# Patient Record
Sex: FEMALE | Race: WHITE | NOT HISPANIC OR LATINO | Employment: OTHER | URBAN - METROPOLITAN AREA
[De-identification: names, ages, dates, MRNs, and addresses within clinical notes are randomized per-mention and may not be internally consistent; named-entity substitution may affect disease eponyms.]

---

## 2017-01-09 ENCOUNTER — AMBULATORY - HEALTHEAST (OUTPATIENT)
Dept: PULMONOLOGY | Facility: OTHER | Age: 52
End: 2017-01-09

## 2017-01-09 DIAGNOSIS — J45.909 ASTHMA: ICD-10-CM

## 2017-01-12 ENCOUNTER — AMBULATORY - HEALTHEAST (OUTPATIENT)
Dept: PULMONOLOGY | Facility: OTHER | Age: 52
End: 2017-01-12

## 2017-01-12 DIAGNOSIS — J45.909 ASTHMA: ICD-10-CM

## 2017-01-13 ENCOUNTER — AMBULATORY - HEALTHEAST (OUTPATIENT)
Dept: PULMONOLOGY | Facility: OTHER | Age: 52
End: 2017-01-13

## 2017-04-24 ENCOUNTER — COMMUNICATION - HEALTHEAST (OUTPATIENT)
Dept: PULMONOLOGY | Facility: OTHER | Age: 52
End: 2017-04-24

## 2017-04-24 DIAGNOSIS — J45.909 ASTHMA: ICD-10-CM

## 2017-05-26 ENCOUNTER — HEALTH MAINTENANCE LETTER (OUTPATIENT)
Age: 52
End: 2017-05-26

## 2017-06-05 ENCOUNTER — AMBULATORY - HEALTHEAST (OUTPATIENT)
Dept: PULMONOLOGY | Facility: OTHER | Age: 52
End: 2017-06-05

## 2017-06-05 DIAGNOSIS — J45.909 ASTHMA: ICD-10-CM

## 2021-06-08 NOTE — PROGRESS NOTES
Pt returned call and LM that she got a call from [employer] saying that they were going to cover the Advair.  Haritha is going to call the pharmacy and see if it went through.  She said that she will call me back if it does not go through and she needs further help.

## 2021-06-08 NOTE — PROGRESS NOTES
Received a denial back from [facility].  They denied it because the formulary alternatives have not been tried.  The preferred formulary alternatives are: Serevent Diskus, Aerospan and Asmanex HFA.  I sent a staff message to Dr. Humphries asking if she would like to switch to one of these alternatives? Or would like to do an appeal?  Will wait for response from Dr. Humphries.

## 2021-06-08 NOTE — PROGRESS NOTES
Dr. Humphries responded saying let's try Asmanex first.  I LMTCB for pt to make sure that she would be ok with switching to this before we send rx.  Will wait for return call.

## 2021-06-08 NOTE — PROGRESS NOTES
Received a PA request from [employer] for pts Advair Diskus 100/50mcg.  I called Tai and started a PA over the phone as pt has tried the alternatives.  Will wait to hear a response.

## 2023-10-24 DIAGNOSIS — R30.0 DYSURIA: ICD-10-CM

## 2023-10-24 DIAGNOSIS — H66.90 ACUTE OTITIS MEDIA, UNSPECIFIED OTITIS MEDIA TYPE: Primary | ICD-10-CM

## 2023-10-25 RX ORDER — NEOMYCIN SULFATE, POLYMYXIN B SULFATE AND HYDROCORTISONE 10; 3.5; 1 MG/ML; MG/ML; [USP'U]/ML
SUSPENSION/ DROPS AURICULAR (OTIC)
Qty: 10 ML | Refills: 0 | Status: SHIPPED | OUTPATIENT
Start: 2023-10-25

## 2023-10-25 RX ORDER — CIPROFLOXACIN 500 MG/1
500 TABLET, FILM COATED ORAL 2 TIMES DAILY
Qty: 6 TABLET | Refills: 0 | Status: SHIPPED | OUTPATIENT
Start: 2023-10-25 | End: 2023-10-28

## 2024-03-28 ENCOUNTER — TRANSFERRED RECORDS (OUTPATIENT)
Dept: HEALTH INFORMATION MANAGEMENT | Facility: CLINIC | Age: 59
End: 2024-03-28

## 2024-08-22 ENCOUNTER — PATIENT OUTREACH (OUTPATIENT)
Dept: ONCOLOGY | Facility: CLINIC | Age: 59
End: 2024-08-22
Payer: COMMERCIAL

## 2024-08-22 ENCOUNTER — TRANSCRIBE ORDERS (OUTPATIENT)
Dept: OTHER | Age: 59
End: 2024-08-22

## 2024-08-22 DIAGNOSIS — C50.919 BREAST CANCER (H): Primary | ICD-10-CM

## 2024-08-22 NOTE — PROGRESS NOTES
New Patient Oncology Nurse Navigator Note     Referring provider: Self      Referred to (specialty:) Medical Oncology     Requested provider (if applicable): Dr. Rakel Alas     Date Referral Received: August 22, 2024     Evaluation for:  C50.919 (ICD-10-CM) - Breast cancer (H)     Clinical History (per Nurse review of records provided):      Haritha Benavides is a 59 year old female who had bilateral screening mammograms on 2/12/24 finding an asymmetry in the right breast at the 11 o'clock position at posterior depth and  indeterminate calcifications in the left breast at the 2 o'clock position at posterior depth. Bilateral diagnostic mammograms and right breast ultrasound followed on 2/16. FINDINGS: Right CC and MLO spot C-Views with tomosynthesis. Left CC and ML magnification views. There are scattered areas of fibroglandular density. 0.5 x 0.5 cm group of coarse heterogeneous calcifications at the 2 o'clock position of the left breast, posterior depth, these are suspicious. Persistence of the focal asymmetry at the 10 o'clock position of the right breast, posterior depth.    Right breast ultrasound from the 8-12 o'clock position was performed. No suspicious mass, cyst or architectural distortion. Findings were confirmed with real-time imaging.   RECOMMENDATIONS:  Left breast stereotactic biopsy of the suspicious calcifications at the 2 o'clock position. Depending on the biopsy results, six-month right breast diagnostic mammogram follow-up.     2/20/24 - Case: [id number]   A.  Breast, left, 2 O'clock, Stereotactic, needle core biopsy:  Ductal carcinoma in-situ (DCIS), grade 2, without necrosis  Calcifications associated with DCIS     Estrogen Receptor:         Positive           %   Strong      2/27/24 - Case: [id number]   A.  Breast, right, 11 o'clock Stereotactic, needle core biopsy:  Benign breast tissue with apocrine cysts    She met with Dr. Tamela Alfaro on 3/5/24 and proceeded with  "surgery.    3/28/24 - Case: [id number]   A.  Breast, left, excision:  Ductal carcinoma in-situ (DCIS), see following synoptic report for additional details     DCIS OF THE BREAST: Resection   DCIS OF THE BREAST: COMPLETE EXCISION - All Specimens     SPECIMEN     Procedure:    Excision (less than total mastectomy)      Specimen Laterality:    Left     TUMOR     Tumor Site:    Clock position  :    2 o'clock    Histologic Type:    Ductal carcinoma in situ    Size (Extent) of DCIS:         Number of Blocks with DCIS:    2      Number of Blocks Examined:    14    Architectural Patterns:    Cribriform    Nuclear Grade:    Grade II (intermediate)    Necrosis:    Present, central (expansive \"comedo\" necrosis)    Microcalcifications:    Present in DCIS     MARGINS    Margin Status:    All margins negative for DCIS      Distance from DCIS to Closest Margin:    3 mm      Closest Margin(s) to DCIS:    Superior     REGIONAL LYMPH NODES    Regional Lymph Node Status:    Not applicable (no regional lymph nodes submitted or found)     PATHOLOGIC STAGE CLASSIFICATION (pTNM, AJCC 8th Edition)      Reporting of pT, pN, and (when applicable) pM categories is based on information available to the pathologist at the time the report is issued. As per the AJCC (Chapter 1, 8th Ed.) it is the managing physician s responsibility to establish the final pathologic stage based upon all pertinent information, including but potentially not limited to this pathology report.    pT Category:    pTis (DCIS)    pN Category:    pN not assigned (no nodes submitted or found)      Breast Biomarker Testing Performed on Previous Biopsy:         Estrogen Receptor (ER) Status:    Positive        Percentage of Cells with Nuclear Positivity:    %      Testing Performed on Case Number:    [id number]     Consult with Dimitry Rojas MD on 4/15/24   Has been on hormone replacement therapy for almost 8 years.  Stopped after diagnosis of DCIS. Using   " estradiol vaginal tablet Twice weekly. Tells that she feels better with vaginal estradiol tablets than other  non estrogen once that she had tried  not only in regards to dryness and  pain,  also with decreased urination, tells that she finds other benefits     Consult with radiation oncologist Dr. Scottie Chandler of [facility] on 4/19    DEXA scan 4/29/24 with osteopenia T-score -1.6.     Most recent visit to Dr. Rojas on 5/9/24 with the following topics addressed:  - saw Radiation Oncology, had DCISion RT testing, which showed low predicted risk, no significant benefit from radiation. Radiotherapy omitted  - Discussed role adjuvant endocrine therapy, to prevent ipsilateral recurrences and new events both in same and contralateral breast, by around 4%.  -reviewed protective effects of estrogen and potential side effects with adjuvant endocrine therapy, pulled up-to-date and reviewed. Patient concerned about the side effects, patient would like to think more before making decision.  -discussed that monitoring hormone levels is not a standard and do not correlate with the benefit  -discussed surveillance mammogram annually. Patient concerned about waiting that long, prefers getting mammograms every 6 months. Discussed that it is not a standard, ordered per patient request, discussed to check with insurance to avoid out-of-pocket cost.     Records Location: Care Everywhere, Media, and See Bookmarked material     Records Needed:  Path reports-biopsy and surgery (per protocol)  Pathology reviews (per protocol)  Breast imaging for past 5 years  Systemic imaging (per protocol)  Med onc notes, rad consult note, OP note, surgeons note (per protocol)    8/22 8726 - Telephoned and left voice message on [employer] number for Haritha requesting call back. Explained my role and purpose for the call stating Dr. Alas' next available appointment is in October. She has my contact information or I will call her back on  Monday.     8/26 1216 - Haritha lvm on 8/23 at 0807 indicating she can wait and see Dr. Alas next available and to please add to cancellation list if something were to become available sooner. Telephoned and spoke with Haritha. Writer received referral, reviewed for appropriate plan, and call warm transferred to New Patient Scheduling for completion.

## 2024-10-09 NOTE — TELEPHONE ENCOUNTER
RECORDS STATUS - BREAST    [facility]   DATE REQUESTED:    NOTES DETAILS STATUS   OFFICE NOTE from medical oncologist  -  Dr. Dmiitry Rojas: 8/15/24   OFFICE NOTE from surgeon  -  Dr. Tamela Alfaro: 4/16/24   OFFICE NOTE from radiation oncologist  - /[facility] Dr. Colton Gits: 4/19/24   OPERATIVE REPORT  -  3/28/24: Lumpectomy   MEDICATION LIST [facility]   LABS     PATHOLOGY REPORTS  (Tissue diagnosis, Stage, ER/IN percentage positive and intensity of staining, HER2 IHC, FISH, and all biopsies from breast and any distant metastasis)                 HP/Yarsani, Reports in CE, slides requested 10/9 Requested  3/28/24: [id number]    Not requested/benign per report:  2/27/24: [id number]  2/20/24: [id number]   PATHOLOGY [employer] TRACKING:   TRACKING #: [id number]   GENONOMIC TESTING     TYPE:   (Next Generation Sequencing, including Foundation One testing, and Oncotype score) [facility] - Requested 10/9 3/28/24   IMAGING (NEED IMAGES & REPORT)     MAMMO PACS 3/28/24, 3/27/24, 2/27/24, 2/20/24, 2/16/24, 2/12/24, 8/15/22, 7/30/21, 7/1/21, 4/30/19, 2/2/18, 8/31/16, 8/18/16:    ULTRASOUND PACS 3/27/24, 2/27/24, 2/16/24, 8/31/16:    BONE SCAN PACS 4/29/24:

## 2024-10-10 NOTE — TELEPHONE ENCOUNTER
Action October 10, 2024 12:33 PM    Action Taken Slides from [facility] received and taken to 5th floor path lab for review.

## 2024-10-11 ENCOUNTER — LAB REQUISITION (OUTPATIENT)
Dept: LAB | Facility: CLINIC | Age: 59
End: 2024-10-11
Payer: COMMERCIAL

## 2024-10-11 PROCEDURE — 88321 CONSLTJ&REPRT SLD PREP ELSWR: CPT | Mod: GC | Performed by: STUDENT IN AN ORGANIZED HEALTH CARE EDUCATION/TRAINING PROGRAM

## 2024-10-12 ENCOUNTER — HEALTH MAINTENANCE LETTER (OUTPATIENT)
Age: 59
End: 2024-10-12

## 2024-10-14 ENCOUNTER — PRE VISIT (OUTPATIENT)
Dept: ONCOLOGY | Facility: CLINIC | Age: 59
End: 2024-10-14
Payer: COMMERCIAL

## 2024-10-14 ENCOUNTER — ONCOLOGY VISIT (OUTPATIENT)
Dept: ONCOLOGY | Facility: CLINIC | Age: 59
End: 2024-10-14
Attending: INTERNAL MEDICINE
Payer: COMMERCIAL

## 2024-10-14 VITALS
HEIGHT: 61 IN | RESPIRATION RATE: 18 BRPM | HEART RATE: 107 BPM | OXYGEN SATURATION: 97 % | WEIGHT: 158 LBS | BODY MASS INDEX: 29.83 KG/M2 | TEMPERATURE: 98.8 F | SYSTOLIC BLOOD PRESSURE: 128 MMHG | DIASTOLIC BLOOD PRESSURE: 82 MMHG

## 2024-10-14 DIAGNOSIS — D05.10 DUCTAL CARCINOMA IN SITU (DCIS) OF BREAST, UNSPECIFIED LATERALITY: Primary | ICD-10-CM

## 2024-10-14 DIAGNOSIS — M25.559 ARTHRALGIA OF HIP, UNSPECIFIED LATERALITY: ICD-10-CM

## 2024-10-14 LAB
PATH REPORT.COMMENTS IMP SPEC: NORMAL
PATH REPORT.FINAL DX SPEC: NORMAL
PATH REPORT.GROSS SPEC: NORMAL
PATH REPORT.MICROSCOPIC SPEC OTHER STN: NORMAL
PATH REPORT.RELEVANT HX SPEC: NORMAL
PATH REPORT.RELEVANT HX SPEC: NORMAL
PATH REPORT.SITE OF ORIGIN SPEC: NORMAL

## 2024-10-14 PROCEDURE — 99205 OFFICE O/P NEW HI 60 MIN: CPT | Performed by: INTERNAL MEDICINE

## 2024-10-14 PROCEDURE — G0463 HOSPITAL OUTPT CLINIC VISIT: HCPCS | Performed by: INTERNAL MEDICINE

## 2024-10-14 RX ORDER — DICLOFENAC SODIUM 25 MG/1
TABLET, DELAYED RELEASE ORAL
COMMUNITY
Start: 2024-03-29

## 2024-10-14 RX ORDER — ESTRADIOL 10 UG/1
10 INSERT VAGINAL
COMMUNITY
Start: 2024-09-19

## 2024-10-14 RX ORDER — KETOCONAZOLE 20 MG/ML
SHAMPOO, SUSPENSION TOPICAL
COMMUNITY
Start: 2024-01-18

## 2024-10-14 RX ORDER — LETROZOLE 2.5 MG/1
2.5 TABLET, FILM COATED ORAL DAILY
COMMUNITY
Start: 2024-07-16

## 2024-10-14 RX ORDER — DEXTROAMPHETAMINE SACCHARATE, AMPHETAMINE ASPARTATE, DEXTROAMPHETAMINE SULFATE AND AMPHETAMINE SULFATE 5; 5; 5; 5 MG/1; MG/1; MG/1; MG/1
TABLET ORAL
COMMUNITY
Start: 2024-08-05

## 2024-10-14 RX ORDER — GABAPENTIN 100 MG/1
CAPSULE ORAL
COMMUNITY

## 2024-10-14 RX ORDER — FLUTICASONE PROPIONATE 110 UG/1
1 AEROSOL, METERED RESPIRATORY (INHALATION)
COMMUNITY
Start: 2024-09-09 | End: 2025-09-09

## 2024-10-14 ASSESSMENT — PAIN SCALES - GENERAL: PAINLEVEL: NO PAIN (0)

## 2024-10-14 NOTE — LETTER
10/14/2024      Haritha Benavides  [address]  [place] [address]      Dear Colleague,    Thank you for referring your patient, Haritha Benavides, to the [facility]. Please see a copy of my visit note below.    October 14, 2024    Consultation and Second Opinion:      Left breast DCIS status post lumpectomy 03/28/2024, pTisNx, grade 2 ER % s/p resection, declined radiation, did not tolerate anastrazole    HPI: Mr/Ms Haritha Beanvides is a 59 y.o. yr old female patient with history of asthma, anxiety / depression, GERD seen for management of left breast DCIS  02/12/2024 bilateral screening mammogram with indeterminate calcifications in left breast at 2 o'clock position. Asymmetry in right breast at the 11 o'clock position  02/16/2024 diagnostic bilateral mammogram with scattered areas of fibroglandular density, 0.5 x 0.5 cm heterogeneous calcifications at 2 o'clock left breast. Persistent focal asymmetry at 10 o'clock. right breast. Right breast ultrasound with no suspicious mass, sister architectural distortion.  02/20/2024 left breast 2 o'clock. Biopsy with a DCIS, grade 2 without necrosis. Calcifications associated with DCIS. ER %  02/27/2024 right breast 11 o'clock biopsy with benign breast tissue with a apocrine cysts  03/28/2024 underwent lumpectomy by Dr. Alfaro. Pathology with DCIS, grade 2, necrosis present all margins negative for DCIS ( 3 mm) final pathologic stage P TisNx  Referred to radiation oncology.  DCIS recurrence score risk 3%.  No radiation.  She did a trial of anastrazole x 1 month.       In my discussion, she has had significant menopausal symptoms including the following:  - jaw clenching; now being treated with TMJ specialist  - no libido, vaginal dryness --> mildly improved with vaginal estrogen  - crying  -arthralgias and myalgias, difficulty concentrating,     During this time period, she has also gone through a lot of stressors - both parents  , she lives by [facility].    Has been on hormone replacement therapy for almost 8 years prior to her DCIS diagnosis. Stopped after diagnosis of DCIS. Using estradiol vaginal tablet Twice weekly. Tells that she feels better with vaginal estradiol tablets than other non estrogen once that she had tried not only in regards to dryness and pain, also with decreased urination, tells that she finds other benefits    She has been active.  She is gaining weight.  She is worried about weight loss.       ROS: 10 point ROS neg other than the symptoms noted above in the HPI.    PAST MEDICAL HISTORY  Past Medical History:   Diagnosis Date   Anxiety (HRC)   Asthma (HRC)   Breast cancer (HRC) 22   Depression (HRC)   Gastroesophageal reflux disease 18   Having tests run   Heartburn   Pap smear abnormality of cervix    no treatment   STD (sexually transmitted disease) (HRC)   chlamydia in her 's   Varicella     Patient Active Problem List   Diagnosis   Eczema   Depression with anxiety (HRC)   Migraine with aura and without status migrainosus, not intractable   Moderate persistent asthma with acute exacerbation (HRC)   Seborrheic dermatitis   Bursitis of hip   Seasonal allergic rhinitis   TMJ (temporomandibular joint disorder)   Serrated adenoma of colon   Gonzalez's esophagus without dysplasia   Malignant neoplasm of left breast in female, estrogen receptor positive (HRC)   Major depressive disorder, recurrent severe without psychotic features (HRC)   Attention-deficit hyperactivity disorder, combined type (HRC)   Attention-deficit hyperactivity disorder, predominantly inattentive type (HRC)       PAST SURGICAL HISTORY  Past Surgical History:   Procedure Laterality Date   LIPOSUCTION 's   TONSILLECTOMY   WISDOM TEETH EXTRACTION     SOCIAL HISTORY  Social History - 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, astrologer, air[employer]  No tobacco use  Partner is a family practitioner  Very good  "friends         FAMILY HISTORY : no cancers    PE:  /82 (BP Location: Right arm, Patient Position: Sitting, Cuff Size: Adult Regular)   Pulse 107   Temp 98.8  F (37.1  C) (Oral)   Resp 18   Ht 1.549 m (5' 1\")   Wt 71.7 kg (158 lb)   SpO2 97%   BMI 29.85 kg/m    Gen: well appearing NAD  HEENT: no icterus  No lad  Cv: rrr  Lungs: clear  Abd: soft, nt nd + bs  Breasts:  scar in upper outer quadrant of the Left breast, no nodules or masses, no axillary adenopathy    Reviewed labs and imaging    A/P:  58 y/o postmenopausal female with Tis L breast cancer, stage 0 s/p resection and no further therapy.    I had a long conversation today with Haritha about the treatment of DCIS.  We discussed that this is a precancer or noninvasive left breast cancer otherwise referred to as stage 0.  The treatment is surgical resection with consideration of radiation treatment.  She underwent surgical resection with clean margins.  Given her low DCIS recurrence score adjuvant radiation was declined.    I discussed with Haritha there are several studies ongoing looking at observation for patients with grade 1 and grade 2 DCIS such as hers.  1 of these trials is the Comet study.  This will be reported out at the [place] breast cancer symposium in .  I reviewed with her that in this clinical trial patients are randomized to standard of care which at that time was consisting of surgical resection and radiation with consideration of endocrine therapy or observation with mammograms every 6 months.  In the observation arm patients were eligible for endocrine therapy but this was not required.    I reviewed with Haritha we do not have the results of this study.  This does provide some guidance however that the current management of DCIS is controversial.  And that the risk of recurrence with someone with a low-grade DCIS of grade 1 and 2 is extremely small.  I agree with her treatment plan of resection.  It is also reasonable that " radiation was not recommended.    We discussed the use of antiestrogen therapy in patients with DCIS.  We discussed the use of tamoxifen at 5 mg or 20 mg or the use of endocrine therapy with an aromatase inhibitor such as exemestane have been studied in patients with DCIS.  These clinical trials showing improvement in reducing the risk of local recurrence or contralateral breast cancer recurrence.  They do not however improve overall survival in these patients.  Given the terrible symptoms that Haritha experienced with difficulty concentrating arthralgias myalgias anxiety, I did not recommend that she resume anastrozole.  She is in agreement with this plan.    The remainder of our time we discussed symptom management as well as ongoing surveillance.  I reviewed with her the ASCO guidelines for ongoing surveillance as well as the choosing wisely campaign which specifically recommends against CT scans, PET scans, and bone scans.  This is not recommended in those with early stage breast cancer.  For patients with DCIS this is not recommended given the concerns for causing harm with radiation exposure additional testing and inappropriate procedures.    We discussed ongoing breast surveillance using mammogram.  Given her dense breast tissue and recent recovery I recommended a diagnostic mammogram since it has been 6 months.  Based on the comet trial these were done every 6 months although the typical ASCO and NCCN guidelines recommend mammograms annually.    For other symptom management we discussed the importance of regular exercise, nutrition, and holistic health with meditation.  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  I recommended the book holistic menopause by Dr. Viki Meade a retired family practice physician here at the [facility].    She was given additional supportive resources from our survivorship diet [facility] webpage.    Given Haritha's anxiety, I recommended she remain On her Adderall as well as  Wellbutrin.    We did discuss given her current symptoms of pain and anxiety I recommended a one-time CT scan of the chest abdomen and pelvis to ensure there is no concerning findings.  I also recommended a new mammogram.    The patient will return to see Dina Jorge a symptom  in our survivorship clinic.  I will plan to see her back in 6 months.  I discussed that most patients with DCIS are followed annually for the first couple of years.  We will continue to see how Haritha is progressing with her symptoms to come up with a long-term plan.    > 75 min were spent in counseling and coordinating care, with an additional 20 min reviewing records.        Again, thank you for allowing me to participate in the care of your patient.      Sincerely,    Rakel Alas MD

## 2024-10-14 NOTE — NURSING NOTE
"Oncology Rooming Note    October 14, 2024 12:59 PM   Haritha Benavides is a 59 year old female who presents for:    Chief Complaint   Patient presents with    Oncology Clinic Visit     New Oncology Visit Breast Cancer     Initial Vitals: /82 (BP Location: Right arm, Patient Position: Sitting, Cuff Size: Adult Regular)   Pulse 107   Temp 98.8  F (37.1  C) (Oral)   Resp 18   Ht 1.549 m (5' 1\")   Wt 71.7 kg (158 lb)   SpO2 97%   BMI 29.85 kg/m   Estimated body mass index is 29.85 kg/m  as calculated from the following:    Height as of this encounter: 1.549 m (5' 1\").    Weight as of this encounter: 71.7 kg (158 lb). Body surface area is 1.76 meters squared.  No Pain (0) Comment: Data Unavailable   No LMP recorded.  Allergies reviewed: No  Medications reviewed: No    Medications: Medication refills not needed today.  Pharmacy name entered into EPIC:    [place], [place]  [id number] [place], [place] - [address] [place] [address] & [facility] [place], [place] - [address] [place], [place] [id number] [place], [place] - [address]    Frailty Screening:   Is the patient here for a new oncology consult visit in cancer care? 1. Yes. Over the past month, have you experienced difficulty or required a caregiver to assist with:   1. Balance, walking or general mobility (including any falls)? NO  2. Completion of self-care tasks such as bathing, dressing, toileting, grooming/hygiene?  NO  3. Concentration or memory that affects your daily life?  YES       Clinical concerns:  none      Joo Dudley LPN            "

## 2024-10-14 NOTE — PROGRESS NOTES
2024    Consultation and Second Opinion:      Left breast DCIS status post lumpectomy 2024, pTisNx, grade 2 ER % s/p resection, declined radiation, did not tolerate anastrazole    HPI: Mr/Ms Haritha Benavides is a 59 y.o. yr old female patient with history of asthma, anxiety / depression, GERD seen for management of left breast DCIS  2024 bilateral screening mammogram with indeterminate calcifications in left breast at 2 o'clock position. Asymmetry in right breast at the 11 o'clock position  2024 diagnostic bilateral mammogram with scattered areas of fibroglandular density, 0.5 x 0.5 cm heterogeneous calcifications at 2 o'clock left breast. Persistent focal asymmetry at 10 o'clock. right breast. Right breast ultrasound with no suspicious mass, sister architectural distortion.  2024 left breast 2 o'clock. Biopsy with a DCIS, grade 2 without necrosis. Calcifications associated with DCIS. ER %  2024 right breast 11 o'clock biopsy with benign breast tissue with a apocrine cysts  2024 underwent lumpectomy by Dr. Alfaro. Pathology with DCIS, grade 2, necrosis present all margins negative for DCIS ( 3 mm) final pathologic stage P TisNx  Referred to radiation oncology.  DCIS recurrence score risk 3%.  No radiation.  She did a trial of anastrazole x 1 month.       In my discussion, she has had significant menopausal symptoms including the following:  - jaw clenching; now being treated with TMJ specialist  - no libido, vaginal dryness --> mildly improved with vaginal estrogen  - crying  -arthralgias and myalgias, difficulty concentrating,     During this time period, she has also gone through a lot of stressors - both parents , she lives by [facility].    Has been on hormone replacement therapy for almost 8 years prior to her DCIS diagnosis. Stopped after diagnosis of DCIS. Using estradiol vaginal tablet Twice weekly. Tells that she feels better with vaginal  "estradiol tablets than other non estrogen once that she had tried not only in regards to dryness and pain, also with decreased urination, tells that she finds other benefits    She has been active.  She is gaining weight.  She is worried about weight loss.       ROS: 10 point ROS neg other than the symptoms noted above in the HPI.    PAST MEDICAL HISTORY  Past Medical History:   Diagnosis Date   Anxiety (HRC)   Asthma (HRC)   Breast cancer (HRC) 24   Depression (HRC)   Gastroesophageal reflux disease 20   Having tests run   Heartburn   Pap smear abnormality of cervix   's no treatment   STD (sexually transmitted disease) (HRC)   chlamydia in her 's   Varicella     Patient Active Problem List   Diagnosis   Eczema   Depression with anxiety (HRC)   Migraine with aura and without status migrainosus, not intractable   Moderate persistent asthma with acute exacerbation (HRC)   Seborrheic dermatitis   Bursitis of hip   Seasonal allergic rhinitis   TMJ (temporomandibular joint disorder)   Serrated adenoma of colon   Gonzalez's esophagus without dysplasia   Malignant neoplasm of left breast in female, estrogen receptor positive (HRC)   Major depressive disorder, recurrent severe without psychotic features (HRC)   Attention-deficit hyperactivity disorder, combined type (HRC)   Attention-deficit hyperactivity disorder, predominantly inattentive type (HRC)       PAST SURGICAL HISTORY  Past Surgical History:   Procedure Laterality Date   LIPOSUCTION 's   TONSILLECTOMY   WISDOM TEETH EXTRACTION     SOCIAL HISTORY  Social History - 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, [employer], [employer]  No tobacco use  Partner is a family practitioner  Very good friends         FAMILY HISTORY : no cancers    PE:  /82 (BP Location: Right arm, Patient Position: Sitting, Cuff Size: Adult Regular)   Pulse 107   Temp 98.8  F (37.1  C) (Oral)   Resp 18   Ht 1.549 m (5' 1\")   Wt 71.7 kg (158 lb)   SpO2 " 97%   BMI 29.85 kg/m    Gen: well appearing NAD  HEENT: no icterus  No lad  Cv: rrr  Lungs: clear  Abd: soft, nt nd + bs  Breasts:  scar in upper outer quadrant of the Left breast, no nodules or masses, no axillary adenopathy    Reviewed labs and imaging    A/P:  60 y/o postmenopausal female with Tis L breast cancer, stage 0 s/p resection and no further therapy.    I had a long conversation today with Haritha about the treatment of DCIS.  We discussed that this is a precancer or noninvasive left breast cancer otherwise referred to as stage 0.  The treatment is surgical resection with consideration of radiation treatment.  She underwent surgical resection with clean margins.  Given her low DCIS recurrence score adjuvant radiation was declined.    I discussed with Haritha there are several studies ongoing looking at observation for patients with grade 1 and grade 2 DCIS such as hers.  1 of these trials is the Comet study.  This will be reported out at the [facility] cancer symposium in 2024.  I reviewed with her that in this clinical trial patients are randomized to standard of care which at that time was consisting of surgical resection and radiation with consideration of endocrine therapy or observation with mammograms every 6 months.  In the observation arm patients were eligible for endocrine therapy but this was not required.    I reviewed with Haritha we do not have the results of this study.  This does provide some guidance however that the current management of DCIS is controversial.  And that the risk of recurrence with someone with a low-grade DCIS of grade 1 and 2 is extremely small.  I agree with her treatment plan of resection.  It is also reasonable that radiation was not recommended.    We discussed the use of antiestrogen therapy in patients with DCIS.  We discussed the use of tamoxifen at 5 mg or 20 mg or the use of endocrine therapy with an aromatase inhibitor such as exemestane have been studied in  patients with DCIS.  These clinical trials showing improvement in reducing the risk of local recurrence or contralateral breast cancer recurrence.  They do not however improve overall survival in these patients.  Given the terrible symptoms that Haritha experienced with difficulty concentrating arthralgias myalgias anxiety, I did not recommend that she resume anastrozole.  She is in agreement with this plan.    The remainder of our time we discussed symptom management as well as ongoing surveillance.  I reviewed with her the ASCO guidelines for ongoing surveillance as well as the choosing wisely campaign which specifically recommends against CT scans, PET scans, and bone scans.  This is not recommended in those with early stage breast cancer.  For patients with DCIS this is not recommended given the concerns for causing harm with radiation exposure additional testing and inappropriate procedures.    We discussed ongoing breast surveillance using mammogram.  Given her dense breast tissue and recent recovery I recommended a diagnostic mammogram since it has been 6 months.  Based on the comet trial these were done every 6 months although the typical ASCO and NCCN guidelines recommend mammograms annually.    For other symptom management we discussed the importance of regular exercise, nutrition, and holistic health with meditation.  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  I recommended the book holistic menopause by Dr. Viki Meade a retired family practice physician here at the [place].    She was given additional supportive resources from our survivorship diet [email or web address] webpage.    Given Haritha's anxiety, I recommended she remain On her Adderall as well as Wellbutrin.    We did discuss given her current symptoms of pain and anxiety I recommended a one-time CT scan of the chest abdomen and pelvis to ensure there is no concerning findings.  I also recommended a new mammogram.    The patient will return to see Dina Jorge  a symptom  in our survivorship clinic.  I will plan to see her back in 6 months.  I discussed that most patients with DCIS are followed annually for the first couple of years.  We will continue to see how Haritha is progressing with her symptoms to come up with a long-term plan.    > 75 min were spent in counseling and coordinating care, with an additional 20 min reviewing records.    Rakel Alas MD

## 2024-10-22 PROBLEM — C50.919 BREAST CANCER (H): Status: ACTIVE | Noted: 2024-10-22

## 2024-10-22 NOTE — PROGRESS NOTES
CANCER SURVIVORSHIP VISIT- end of treatment/focused visit  Oct 24, 2024  Surgeon- Dr. Alfaro  Oncologist- Dr. Alas    REASON FOR VISIT: survivorship visit for history of breast cancer    CANCER HISTORY AND TREATMENT:    History of left-sided stage 0 breast cancer, ER positive, diagnosed in 2024  *Diagnosis: 02/20/2024, screening mammogram, 59 years old.   *Surgery: 03/28/2024 left lumpectomy. DCIS, grade 2, necrosis present all margins negative for DCIS ( 3 mm) final pathologic stage P TisNx   *Endocrine therapy: 1 month trial of anastrozole  *Genetic testing: No    INTERVAL HISTORY:     Haritha was referred Dr Dr. Alas for symptom management. We spent the majority of time talking about sexual health. She has been in a good relationship for 5 years. Since going off HRT she has had more vaginal dryness, dyspareunia, and lower libido. She went to [facility] and got a plant based lubricant and has been on topical estrogen for several years. We discussed adding vaginal hyaluronic acid moisturizer and referral to pelvic floor PT for urinary leakage and possibly dilator use. In regards to low desire, we discussed spontaneous vs responsive desire, a concept she is well read on. We discussed Flibanserin and Bremelanotide and that they are FDA approved in premenopausal women at the moment. There is a herbal supplement, Ristela, but cautioned that supplements are not regulated by the FDA. I gave her various sexual health resources including on our [email or web address] website.       Past Medical History:   Diagnosis Date    Allergic rhinitis, cause unspecified     claritin all year, helping    Candidiasis of unspecified site     sinus/body... helped with diet changes through naturopath    Depressive disorder, not elsewhere classified     wellbutrin SR (also helped w/ ADD sx's)    Dysmenorrhea     better on OCPs    Mild intermittent asthma     exercise induced, daily albuterol in summer before biking, doesn't need prior to  "yoga       Current Outpatient Medications   Medication Sig Dispense Refill    albuterol 90 MCG/ACT inhaler Inhale 2 puffs into the lungs. 1-2 puffs Q 4-6 hrs prn 2 Inhaler 0    amphetamine-dextroamphetamine (ADDERALL) 20 MG tablet Take 1/2 tab BID      buPROPion (WELLBUTRIN SR) 150 MG 12 hr tablet Take 1 tablet by mouth 2 times daily. (Patient not taking: Reported on 10/14/2024) 180 tablet 3    buPROPion (WELLBUTRIN SR) 150 MG 12 hr tablet Take 1 tablet by mouth 2 times daily. 180 tablet 3    butenafine (MENTAX) 1 % CREA Apply to both feet twice daily for 2 weeks (Patient not taking: Reported on 10/14/2024) 22 g 2    CETIRIZINE HCL 10 MG OR TABS 1 TABLET DAILY 90 Tab 3    cinnamon 500 MG CAPS Take 2 capsules by mouth daily.      diclofenac (VOLTAREN) 25 MG EC tablet       esomeprazole (NEXIUM) 20 MG DR capsule Take 1 capsule by mouth daily.      estradiol (VAGIFEM) 10 MCG TABS vaginal tablet Place 10 mcg vaginally.      fluocinolone (SYNALAR) 0.01 % external solution APPLY TO SCALP  mL 0    fluticasone (FLOVENT HFA) 110 MCG/ACT inhaler Inhale 1 puff into the lungs.      fluticasone-salmeterol (ADVAIR DISKUS) 500-50 MCG/DOSE diskus inhaler Inhale 1 puff into the lungs every 12 hours. 3 Inhaler 12    gabapentin (NEURONTIN) 100 MG capsule TAKE 1 TO 3 CAPSULES BY MOUTH UP TO THREE TIMES DAILY AS NEEDED      ketoconazole (NIZORAL) 2 % external shampoo USE TO SHAMPOO DAILY OR FREQUENTLY ENOUGH TO CONTROL DANDRUFF      letrozole (FEMARA) 2.5 MG tablet Take 2.5 mg by mouth daily.      LORATADINE 10 MG OR TABS TAKE ONE TABLET BY MOUTH DAILY PRN      melatonin 3 MG CAPS Take 1 capsule by mouth At Bedtime.      Multiple Vitamins-Minerals (ANTIOXIDANT) CAPS Take 1 capsule by mouth daily. \"Protandim\" - milk thistle, bacopa, ashwagandha, green tea, tumeric.       norethindrone-ethinyl estradiol (MICROGESTIN FE 1/20) 1-20 MG-MCG per tablet Take 1 tablet by mouth daily Due for physical (Patient not taking: Reported on " 10/14/2024) 30 tablet 0    POCKET SPACER BIN spacer for MDI inhaler 1 0    Urea 20 % CREA Apply to both feet twice daily (Patient not taking: Reported on 10/14/2024) 45 g 11          Allergies   Allergen Reactions    Gluten Meal     Seasonal Allergies      Ragweed, trees, grass    Sulfa Antibiotics Rash       Family History   Problem Relation Age of Onset    Diabetes Paternal Grandmother     Diabetes Paternal Uncle         Type 2    Cerebrovascular Disease Maternal Grandmother     Alcohol/Drug Father     Alcohol/Drug Maternal Grandmother     Alcohol/Drug Maternal Grandfather     Alcohol/Drug Paternal Grandfather     Anesthesia Reaction Brother     Anesthesia Reaction Mother     Arthritis Mother     Arthritis Maternal Aunt     Circulatory Paternal Grandmother         Had diabetes-lost a leg    Circulatory Mother         Blood clots, veins stripped    Psychotic Disorder Brother         Schizophrenia,  in MVA    Gastrointestinal Disease Mother         Ulcers    Gynecology Mother         Full hysterectomy due to heavy flows    Lipids Father     Respiratory Mother         Asthma    Cerebrovascular Disease Maternal Grandfather     Cerebrovascular Disease Maternal Aunt     C.A.D. Paternal Uncle          of MI age 45    Respiratory Mother         Sleep apnea     Heart Disease Mother         Atrial Fibrillation     Polychondritis Mother         recurring    Deep Vein Thrombosis Mother         leg    No Known Problems Father     Substance Abuse Brother     Cerebrovascular Disease Maternal Aunt     Substance Abuse Brother     Asthma Mother         Social history:  Living situation: Lives in [facility], has a long term male partner    Physical exam  /79 (BP Location: Right arm, Patient Position: Right side, Cuff Size: Adult Regular)   Pulse 98   Temp 98.6  F (37  C) (Oral)   Resp 16   Wt 69.5 kg (153 lb 3.2 oz)   SpO2 99%   BMI 28.95 kg/m    Wt Readings from Last 4 Encounters:   10/14/24 71.7 kg (158 lb)    11/03/15 63.7 kg (140 lb 8 oz)   09/29/15 63.2 kg (139 lb 7 oz)   09/23/15 63.5 kg (140 lb)      General: No acute distress, affect teary at times      IMPRESSION/PLAN:    History of left-sided stage 0 breast cancer, ER positive, diagnosed in 2024  Low risk for late effects  Surveillance includes mammogram annually (Dr. Alas is planning on twice a year)  Follow-up as routine with Dr Alas  Follow-up with me as needed    Potential late effects related to surgery:  -Risk of lymphedema: If she notices any swelling in her arm, she should be offered a referral to lymphedema physical therapy.     Genitourinary syndrome of menopause (ie vaginal dryness, pain with penetration)  -already using a good lubricant and vagifem. Trial of topical hyaluronic acid.   -referral to pelvic floor PT    Hypoactive sexual desire disorder (HSDD)- Low desire  -Discussed that this is multifactorial (physical, mental, psychosocial), may be influenced by medications and is dynamic over time  -Discussed practical suggestions: avoid screens in the bedroom, exercise, plan a time for intimacy when energy is best and distractions and stress lowest.   -Recommended exploring reactive desire. Consider being open to discovering what increases your arousal. [employer] (sex shop in [place] with body safe products and sex educator employees), Kaitlynn (sexual health [place] created by doctors), or [employer] (podcast with sexual stories) are examples of resources.   -She is interested in Dr. Guerra's mindfulness based sexual health group  -Discussed Flibanserin and Bremelanotide which are currently FDA approved only in premenopausal patients   -Discussed Ristela and the limitations of what we understand about supplements    Non-urgent scheduling should be complete within 7-10 business days. However, if you have not received a phone call from scheduling within 3 business days, you may call to schedule at [phone] option 5, option 2. This is the same  number to call to make changes tor if you have questions about the schedule.    Gave resources for our [facility] Cancer Survivorship class series and mailings list for educational opportunities. [email or web address]    Cancer treatment summary was sent electronically to the patient and her PCP.      The total time of this encounter amounted to XX minutes.This time included time spent with the patient, prep work, ordering tests, creating a cancer treatment summary, and performing post visit documentation.    Dina Jorge, PhD, MPAS, PA-C  [facility] Survivorship [facility]

## 2024-10-24 ENCOUNTER — ONCOLOGY VISIT (OUTPATIENT)
Dept: ONCOLOGY | Facility: CLINIC | Age: 59
End: 2024-10-24
Attending: INTERNAL MEDICINE
Payer: COMMERCIAL

## 2024-10-24 VITALS
BODY MASS INDEX: 28.95 KG/M2 | RESPIRATION RATE: 16 BRPM | OXYGEN SATURATION: 99 % | WEIGHT: 153.2 LBS | DIASTOLIC BLOOD PRESSURE: 79 MMHG | SYSTOLIC BLOOD PRESSURE: 128 MMHG | HEART RATE: 98 BPM | TEMPERATURE: 98.6 F

## 2024-10-24 DIAGNOSIS — C50.919 MALIGNANT NEOPLASM OF BREAST IN FEMALE, ESTROGEN RECEPTOR POSITIVE, UNSPECIFIED LATERALITY, UNSPECIFIED SITE OF BREAST (H): Primary | ICD-10-CM

## 2024-10-24 DIAGNOSIS — Z17.0 MALIGNANT NEOPLASM OF BREAST IN FEMALE, ESTROGEN RECEPTOR POSITIVE, UNSPECIFIED LATERALITY, UNSPECIFIED SITE OF BREAST (H): Primary | ICD-10-CM

## 2024-10-24 DIAGNOSIS — N95.8 GENITOURINARY SYNDROME OF MENOPAUSE: ICD-10-CM

## 2024-10-24 DIAGNOSIS — F52.0 HYPOACTIVE SEXUAL DESIRE: ICD-10-CM

## 2024-10-24 PROCEDURE — 99214 OFFICE O/P EST MOD 30 MIN: CPT | Performed by: PHYSICIAN ASSISTANT

## 2024-10-24 PROCEDURE — G0463 HOSPITAL OUTPT CLINIC VISIT: HCPCS | Performed by: PHYSICIAN ASSISTANT

## 2024-10-24 RX ORDER — BUPROPION HYDROCHLORIDE 150 MG/1
TABLET, FILM COATED, EXTENDED RELEASE ORAL
COMMUNITY
Start: 2024-10-22

## 2024-10-24 RX ORDER — ALBUTEROL SULFATE 90 UG/1
AEROSOL, METERED RESPIRATORY (INHALATION)
COMMUNITY
Start: 2024-10-22

## 2024-10-24 ASSESSMENT — PAIN SCALES - GENERAL: PAINLEVEL_OUTOF10: NO PAIN (0)

## 2024-10-24 NOTE — NURSING NOTE
"Oncology Rooming Note    October 24, 2024 2:07 PM   Haritha Benavides is a 59 year old female who presents for:    Chief Complaint   Patient presents with    Oncology Clinic Visit     RTN for Breast Cancer     Initial Vitals: /79 (BP Location: Right arm, Patient Position: Right side, Cuff Size: Adult Regular)   Pulse 98   Temp 98.6  F (37  C) (Oral)   Resp 16   Wt 69.5 kg (153 lb 3.2 oz)   SpO2 99%   BMI 28.95 kg/m   Estimated body mass index is 28.95 kg/m  as calculated from the following:    Height as of 10/14/24: 1.549 m (5' 1\").    Weight as of this encounter: 69.5 kg (153 lb 3.2 oz). Body surface area is 1.73 meters squared.  No Pain (0) Comment: Data Unavailable   No LMP recorded.  Allergies reviewed: Yes  Medications reviewed: Yes    Medications: Medication refills not needed today.  Pharmacy name entered into EPIC:    [id number] - [place], [place] - [address] [place] [address] & [facility] [place], [place] - [address] [place], [place] [id number] - [place], [place] - [address]    Frailty Screening:   Is the patient here for a new oncology consult visit in cancer care? 2. No      Clinical concerns: NONE       Kassie Blake MA             "

## 2024-10-24 NOTE — LETTER
10/24/2024      Haritha Benavides  [address]  [facility] [address]      Dear Colleague,    Thank you for referring your patient, Haritha Benavides, to the [facility]. Please see a copy of my visit note below.    CANCER SURVIVORSHIP VISIT- end of treatment/focused visit  Oct 24, 2024  Surgeon- Dr. Alfaro  Oncologist- Dr. Alas    REASON FOR VISIT: survivorship visit for history of breast cancer    CANCER HISTORY AND TREATMENT:    History of left-sided stage 0 breast cancer, ER positive, diagnosed in 2024  *Diagnosis: 02/20/2024, screening mammogram, 59 years old.   *Surgery: 03/28/2024 left lumpectomy. DCIS, grade 2, necrosis present all margins negative for DCIS ( 3 mm) final pathologic stage P TisNx   *Endocrine therapy: 1 month trial of anastrozole  *Genetic testing: No    INTERVAL HISTORY:     Haritha was referred Dr Dr. Alas for symptom management. We spent the majority of time talking about sexual health. She has been in a good relationship for 5 years. Since going off HRT she has had more vaginal dryness, dyspareunia, and lower libido. She went to [facility] and got a plant based lubricant and has been on topical estrogen for several years. We discussed adding vaginal hyaluronic acid moisturizer and referral to pelvic floor PT for urinary leakage and possibly dilator use. In regards to low desire, we discussed spontaneous vs responsive desire, a concept she is well read on. We discussed Flibanserin and Bremelanotide and that they are FDA approved in premenopausal women at the moment. There is a herbal supplement, Ristela, but cautioned that supplements are not regulated by the FDA. I gave her various sexual health resources including on our survivorship.[email or web address] website.       Past Medical History:   Diagnosis Date    Allergic rhinitis, cause unspecified     claritin all year, helping    Candidiasis of unspecified site     sinus/body... helped with diet changes through  naturopath    Depressive disorder, not elsewhere classified     wellbutrin SR (also helped w/ ADD sx's)    Dysmenorrhea     better on OCPs    Mild intermittent asthma     exercise induced, daily albuterol in summer before biking, doesn't need prior to yoga       Current Outpatient Medications   Medication Sig Dispense Refill    albuterol 90 MCG/ACT inhaler Inhale 2 puffs into the lungs. 1-2 puffs Q 4-6 hrs prn 2 Inhaler 0    amphetamine-dextroamphetamine (ADDERALL) 20 MG tablet Take 1/2 tab BID      buPROPion (WELLBUTRIN SR) 150 MG 12 hr tablet Take 1 tablet by mouth 2 times daily. (Patient not taking: Reported on 10/14/2024) 180 tablet 3    buPROPion (WELLBUTRIN SR) 150 MG 12 hr tablet Take 1 tablet by mouth 2 times daily. 180 tablet 3    butenafine (MENTAX) 1 % CREA Apply to both feet twice daily for 2 weeks (Patient not taking: Reported on 10/14/2024) 22 g 2    CETIRIZINE HCL 10 MG OR TABS 1 TABLET DAILY 90 Tab 3    cinnamon 500 MG CAPS Take 2 capsules by mouth daily.      diclofenac (VOLTAREN) 25 MG EC tablet       esomeprazole (NEXIUM) 20 MG DR capsule Take 1 capsule by mouth daily.      estradiol (VAGIFEM) 10 MCG TABS vaginal tablet Place 10 mcg vaginally.      fluocinolone (SYNALAR) 0.01 % external solution APPLY TO SCALP  mL 0    fluticasone (FLOVENT HFA) 110 MCG/ACT inhaler Inhale 1 puff into the lungs.      fluticasone-salmeterol (ADVAIR DISKUS) 500-50 MCG/DOSE diskus inhaler Inhale 1 puff into the lungs every 12 hours. 3 Inhaler 12    gabapentin (NEURONTIN) 100 MG capsule TAKE 1 TO 3 CAPSULES BY MOUTH UP TO THREE TIMES DAILY AS NEEDED      ketoconazole (NIZORAL) 2 % external shampoo USE TO SHAMPOO DAILY OR FREQUENTLY ENOUGH TO CONTROL DANDRUFF      letrozole (FEMARA) 2.5 MG tablet Take 2.5 mg by mouth daily.      LORATADINE 10 MG OR TABS TAKE ONE TABLET BY MOUTH DAILY PRN      melatonin 3 MG CAPS Take 1 capsule by mouth At Bedtime.      Multiple Vitamins-Minerals (ANTIOXIDANT) CAPS Take 1 capsule  "by mouth daily. \"Protandim\" - milk thistle, bacopa, ashwagandha, green tea, tumeric.       norethindrone-ethinyl estradiol (MICROGESTIN FE ) 1-20 MG-MCG per tablet Take 1 tablet by mouth daily Due for physical (Patient not taking: Reported on 10/14/2024) 30 tablet 0    POCKET SPACER BIN spacer for MDI inhaler 1 0    Urea 20 % CREA Apply to both feet twice daily (Patient not taking: Reported on 10/14/2024) 45 g 11          Allergies   Allergen Reactions    Gluten Meal     Seasonal Allergies      Ragweed, trees, grass    Sulfa Antibiotics Rash       Family History   Problem Relation Age of Onset    Diabetes Paternal Grandmother     Diabetes Paternal Uncle         Type 2    Cerebrovascular Disease Maternal Grandmother     Alcohol/Drug Father     Alcohol/Drug Maternal Grandmother     Alcohol/Drug Maternal Grandfather     Alcohol/Drug Paternal Grandfather     Anesthesia Reaction Brother     Anesthesia Reaction Mother     Arthritis Mother     Arthritis Maternal Aunt     Circulatory Paternal Grandmother         Had diabetes-lost a leg    Circulatory Mother         Blood clots, veins stripped    Psychotic Disorder Brother         Schizophrenia,  in MVA    Gastrointestinal Disease Mother         Ulcers    Gynecology Mother         Full hysterectomy due to heavy flows    Lipids Father     Respiratory Mother         Asthma    Cerebrovascular Disease Maternal Grandfather     Cerebrovascular Disease Maternal Aunt     C.A.D. Paternal Uncle          of MI age 45    Respiratory Mother         Sleep apnea     Heart Disease Mother         Atrial Fibrillation     Polychondritis Mother         recurring    Deep Vein Thrombosis Mother         leg    No Known Problems Father     Substance Abuse Brother     Cerebrovascular Disease Maternal Aunt     Substance Abuse Brother     Asthma Mother         Social history:  Living situation: Lives in [facility], has a long term male partner    Physical exam  /79 (BP Location: Right " arm, Patient Position: Right side, Cuff Size: Adult Regular)   Pulse 98   Temp 98.6  F (37  C) (Oral)   Resp 16   Wt 69.5 kg (153 lb 3.2 oz)   SpO2 99%   BMI 28.95 kg/m    Wt Readings from Last 4 Encounters:   10/14/24 71.7 kg (158 lb)   11/03/15 63.7 kg (140 lb 8 oz)   09/29/15 63.2 kg (139 lb 7 oz)   09/23/15 63.5 kg (140 lb)      General: No acute distress, affect teary at times      IMPRESSION/PLAN:    History of left-sided stage 0 breast cancer, ER positive, diagnosed in 2024  Low risk for late effects  Surveillance includes mammogram annually (Dr. Alas is planning on twice a year)  Follow-up as routine with Dr Alas  Follow-up with me as needed    Potential late effects related to surgery:  -Risk of lymphedema: If she notices any swelling in her arm, she should be offered a referral to lymphedema physical therapy.     Genitourinary syndrome of menopause (ie vaginal dryness, pain with penetration)  -already using a good lubricant and vagifem. Trial of topical hyaluronic acid.   -referral to pelvic floor PT    Hypoactive sexual desire disorder (HSDD)- Low desire  -Discussed that this is multifactorial (physical, mental, psychosocial), may be influenced by medications and is dynamic over time  -Discussed practical suggestions: avoid screens in the bedroom, exercise, plan a time for intimacy when energy is best and distractions and stress lowest.   -Recommended exploring reactive desire. Consider being open to discovering what increases your arousal. [employer] (sex shop in [place] with body safe products and sex educator employees), Kaitlynn (sexual health [place] created by doctors), or [employer] (podcast with sexual stories) are examples of resources.   -She is interested in Dr. Guerra's mindfulness based sexual health group  -Discussed Flibanserin and Bremelanotide which are currently FDA approved only in premenopausal patients   -Discussed Ristela and the limitations of what we understand about  supplements    Non-urgent scheduling should be complete within 7-10 business days. However, if you have not received a phone call from scheduling within 3 business days, you may call to schedule at [phone] option 5, option 2. This is the same number to call to make changes tor if you have questions about the schedule.    Gave resources for our Thrive Cancer Survivorship class series and mailings list for educational opportunities. [email or web address]    Cancer treatment summary was sent electronically to the patient and her PCP.      The total time of this encounter amounted to XX minutes.This time included time spent with the patient, prep work, ordering tests, creating a cancer treatment summary, and performing post visit documentation.    Again, thank you for allowing me to participate in the care of your patient.      Sincerely,    Dina Jorge PA-C

## 2024-10-24 NOTE — LETTER
10/24/2024         RE: Haritha RANDALL Makayla  [address]  [facility] [id number]      CANCER SURVIVORSHIP VISIT- end of treatment/focused visit  Oct 24, 2024  Surgeon- Dr. Alfaro  Oncologist- Dr. Alas    REASON FOR VISIT: survivorship visit for history of breast cancer    CANCER HISTORY AND TREATMENT:    History of left-sided stage 0 breast cancer, ER positive, diagnosed in 2024  *Diagnosis: 02/20/2024, screening mammogram, 59 years old.   *Surgery: 03/28/2024 left lumpectomy. DCIS, grade 2, necrosis present all margins negative for DCIS ( 3 mm) final pathologic stage P TisNx   *Endocrine therapy: 1 month trial of anastrozole  *Genetic testing: No    INTERVAL HISTORY:     Haritha was referred Dr Dr. Alas for symptom management. We spent the majority of time talking about sexual health. She has been in a good relationship for 5 years. Since going off HRT she has had more vaginal dryness, dyspareunia, and lower libido. She went to [facility] and got a plant based lubricant and has been on topical estrogen for several years. We discussed adding vaginal hyaluronic acid moisturizer and referral to pelvic floor PT for urinary leakage and possibly dilator use. In regards to low desire, we discussed spontaneous vs responsive desire, a concept she is well read on. We discussed Flibanserin and Bremelanotide and that they are FDA approved in premenopausal women at the moment. There is a herbal supplement, Ristela, but cautioned that supplements are not regulated by the FDA. I gave her various sexual health resources including on our survivorship.[facility] website.       Past Medical History:   Diagnosis Date     Allergic rhinitis, cause unspecified     claritin all year, helping     Candidiasis of unspecified site     sinus/body... helped with diet changes through naturopath     Depressive disorder, not elsewhere classified     wellbutrin SR (also helped w/ ADD sx's)     Dysmenorrhea     better on OCPs     Mild  "intermittent asthma     exercise induced, daily albuterol in summer before biking, doesn't need prior to yoga       Current Outpatient Medications   Medication Sig Dispense Refill     albuterol 90 MCG/ACT inhaler Inhale 2 puffs into the lungs. 1-2 puffs Q 4-6 hrs prn 2 Inhaler 0     amphetamine-dextroamphetamine (ADDERALL) 20 MG tablet Take 1/2 tab BID       buPROPion (WELLBUTRIN SR) 150 MG 12 hr tablet Take 1 tablet by mouth 2 times daily. (Patient not taking: Reported on 10/14/2024) 180 tablet 3     buPROPion (WELLBUTRIN SR) 150 MG 12 hr tablet Take 1 tablet by mouth 2 times daily. 180 tablet 3     butenafine (MENTAX) 1 % CREA Apply to both feet twice daily for 2 weeks (Patient not taking: Reported on 10/14/2024) 22 g 2     CETIRIZINE HCL 10 MG OR TABS 1 TABLET DAILY 90 Tab 3     cinnamon 500 MG CAPS Take 2 capsules by mouth daily.       diclofenac (VOLTAREN) 25 MG EC tablet        esomeprazole (NEXIUM) 20 MG DR capsule Take 1 capsule by mouth daily.       estradiol (VAGIFEM) 10 MCG TABS vaginal tablet Place 10 mcg vaginally.       fluocinolone (SYNALAR) 0.01 % external solution APPLY TO SCALP  mL 0     fluticasone (FLOVENT HFA) 110 MCG/ACT inhaler Inhale 1 puff into the lungs.       fluticasone-salmeterol (ADVAIR DISKUS) 500-50 MCG/DOSE diskus inhaler Inhale 1 puff into the lungs every 12 hours. 3 Inhaler 12     gabapentin (NEURONTIN) 100 MG capsule TAKE 1 TO 3 CAPSULES BY MOUTH UP TO THREE TIMES DAILY AS NEEDED       ketoconazole (NIZORAL) 2 % external shampoo USE TO SHAMPOO DAILY OR FREQUENTLY ENOUGH TO CONTROL DANDRUFF       letrozole (FEMARA) 2.5 MG tablet Take 2.5 mg by mouth daily.       LORATADINE 10 MG OR TABS TAKE ONE TABLET BY MOUTH DAILY PRN       melatonin 3 MG CAPS Take 1 capsule by mouth At Bedtime.       Multiple Vitamins-Minerals (ANTIOXIDANT) CAPS Take 1 capsule by mouth daily. \"Protandim\" - milk thistle, bacopa, ashwagandha, green tea, tumeric.        norethindrone-ethinyl estradiol " (MICROGESTIN FE ) 1-20 MG-MCG per tablet Take 1 tablet by mouth daily Due for physical (Patient not taking: Reported on 10/14/2024) 30 tablet 0     POCKET SPACER BIN spacer for MDI inhaler 1 0     Urea 20 % CREA Apply to both feet twice daily (Patient not taking: Reported on 10/14/2024) 45 g 11          Allergies   Allergen Reactions     Gluten Meal      Seasonal Allergies      Ragweed, trees, grass     Sulfa Antibiotics Rash       Family History   Problem Relation Age of Onset     Diabetes Paternal Grandmother      Diabetes Paternal Uncle         Type 2     Cerebrovascular Disease Maternal Grandmother      Alcohol/Drug Father      Alcohol/Drug Maternal Grandmother      Alcohol/Drug Maternal Grandfather      Alcohol/Drug Paternal Grandfather      Anesthesia Reaction Brother      Anesthesia Reaction Mother      Arthritis Mother      Arthritis Maternal Aunt      Circulatory Paternal Grandmother         Had diabetes-lost a leg     Circulatory Mother         Blood clots, veins stripped     Psychotic Disorder Brother         Schizophrenia,  in MVA     Gastrointestinal Disease Mother         Ulcers     Gynecology Mother         Full hysterectomy due to heavy flows     Lipids Father      Respiratory Mother         Asthma     Cerebrovascular Disease Maternal Grandfather      Cerebrovascular Disease Maternal Aunt      C.A.D. Paternal Uncle          of MI age 45     Respiratory Mother         Sleep apnea      Heart Disease Mother         Atrial Fibrillation      Polychondritis Mother         recurring     Deep Vein Thrombosis Mother         leg     No Known Problems Father      Substance Abuse Brother      Cerebrovascular Disease Maternal Aunt      Substance Abuse Brother      Asthma Mother         Social history:  Living situation: Lives in [facility], has a long term male partner    Physical exam  /79 (BP Location: Right arm, Patient Position: Right side, Cuff Size: Adult Regular)   Pulse 98   Temp 98.6  F  (37  C) (Oral)   Resp 16   Wt 69.5 kg (153 lb 3.2 oz)   SpO2 99%   BMI 28.95 kg/m    Wt Readings from Last 4 Encounters:   10/14/24 71.7 kg (158 lb)   11/03/15 63.7 kg (140 lb 8 oz)   09/29/15 63.2 kg (139 lb 7 oz)   09/23/15 63.5 kg (140 lb)      General: No acute distress, affect teary at times      IMPRESSION/PLAN:    History of left-sided stage 0 breast cancer, ER positive, diagnosed in 2024  Low risk for late effects  Surveillance includes mammogram annually (Dr. Alas is planning on twice a year)  Follow-up as routine with Dr Alas  Follow-up with me as needed    Potential late effects related to surgery:  -Risk of lymphedema: If she notices any swelling in her arm, she should be offered a referral to lymphedema physical therapy.     Genitourinary syndrome of menopause (ie vaginal dryness, pain with penetration)  -already using a good lubricant and vagifem. Trial of topical hyaluronic acid.   -referral to pelvic floor PT    Hypoactive sexual desire disorder (HSDD)- Low desire  -Discussed that this is multifactorial (physical, mental, psychosocial), may be influenced by medications and is dynamic over time  -Discussed practical suggestions: avoid screens in the bedroom, exercise, plan a time for intimacy when energy is best and distractions and stress lowest.   -Recommended exploring reactive desire. Consider being open to discovering what increases your arousal. [employer] (sex shop in [place] with body safe products and sex educator employees), Kaitlynn (sexual health [place] created by doctors), or [employer] (podcast with sexual stories) are examples of resources.   -She is interested in Dr. Guerra's mindfulness based sexual health group  -Discussed Flibanserin and Bremelanotide which are currently FDA approved only in premenopausal patients   -Discussed Ristela and the limitations of what we understand about supplements    Non-urgent scheduling should be complete within 7-10 business days. However, if you  have not received a phone call from scheduling within 3 business days, you may call to schedule at [phone] option 5, option 2. This is the same number to call to make changes tor if you have questions about the schedule.    Gave resources for our Thrive Cancer Survivorship class series and mailings list for educational opportunities. [email or web address]/[email or web address]    Cancer treatment summary was sent electronically to the patient and her PCP.      The total time of this encounter amounted to XX minutes.This time included time spent with the patient, prep work, ordering tests, creating a cancer treatment summary, and performing post visit documentation.    Dina Jorge, PhD, MPAS, PA-C  [facility]       Dina Jorge PA-C

## 2024-10-29 ENCOUNTER — ANCILLARY PROCEDURE (OUTPATIENT)
Dept: MAMMOGRAPHY | Facility: CLINIC | Age: 59
End: 2024-10-29
Attending: INTERNAL MEDICINE
Payer: COMMERCIAL

## 2024-10-29 ENCOUNTER — LAB (OUTPATIENT)
Dept: LAB | Facility: CLINIC | Age: 59
End: 2024-10-29
Attending: INTERNAL MEDICINE
Payer: COMMERCIAL

## 2024-10-29 ENCOUNTER — ANCILLARY PROCEDURE (OUTPATIENT)
Dept: CT IMAGING | Facility: CLINIC | Age: 59
End: 2024-10-29
Attending: INTERNAL MEDICINE
Payer: COMMERCIAL

## 2024-10-29 DIAGNOSIS — D05.10 DUCTAL CARCINOMA IN SITU (DCIS) OF BREAST, UNSPECIFIED LATERALITY: ICD-10-CM

## 2024-10-29 DIAGNOSIS — M25.559 ARTHRALGIA OF HIP, UNSPECIFIED LATERALITY: ICD-10-CM

## 2024-10-29 LAB
ALBUMIN SERPL BCG-MCNC: 4.4 G/DL (ref 3.5–5.2)
ALP SERPL-CCNC: 131 U/L (ref 40–150)
ALT SERPL W P-5'-P-CCNC: 46 U/L (ref 0–50)
ANION GAP SERPL CALCULATED.3IONS-SCNC: 10 MMOL/L (ref 7–15)
AST SERPL W P-5'-P-CCNC: 30 U/L (ref 0–45)
BASOPHILS # BLD AUTO: 0.1 10E3/UL (ref 0–0.2)
BASOPHILS NFR BLD AUTO: 1 %
BILIRUB SERPL-MCNC: 0.6 MG/DL
BUN SERPL-MCNC: 11.3 MG/DL (ref 8–23)
CALCIUM SERPL-MCNC: 9.9 MG/DL (ref 8.8–10.4)
CHLORIDE SERPL-SCNC: 105 MMOL/L (ref 98–107)
CREAT SERPL-MCNC: 0.68 MG/DL (ref 0.51–0.95)
EGFRCR SERPLBLD CKD-EPI 2021: >90 ML/MIN/1.73M2
EOSINOPHIL # BLD AUTO: 0.5 10E3/UL (ref 0–0.7)
EOSINOPHIL NFR BLD AUTO: 5 %
ERYTHROCYTE [DISTWIDTH] IN BLOOD BY AUTOMATED COUNT: 12.8 % (ref 10–15)
ESTRADIOL SERPL-MCNC: <5 PG/ML
GLUCOSE SERPL-MCNC: 101 MG/DL (ref 70–99)
HCO3 SERPL-SCNC: 25 MMOL/L (ref 22–29)
HCT VFR BLD AUTO: 40.5 % (ref 35–47)
HGB BLD-MCNC: 13.7 G/DL (ref 11.7–15.7)
IMM GRANULOCYTES # BLD: 0 10E3/UL
IMM GRANULOCYTES NFR BLD: 1 %
LYMPHOCYTES # BLD AUTO: 2.7 10E3/UL (ref 0.8–5.3)
LYMPHOCYTES NFR BLD AUTO: 32 %
MCH RBC QN AUTO: 28 PG (ref 26.5–33)
MCHC RBC AUTO-ENTMCNC: 33.8 G/DL (ref 31.5–36.5)
MCV RBC AUTO: 83 FL (ref 78–100)
MONOCYTES # BLD AUTO: 0.7 10E3/UL (ref 0–1.3)
MONOCYTES NFR BLD AUTO: 8 %
NEUTROPHILS # BLD AUTO: 4.5 10E3/UL (ref 1.6–8.3)
NEUTROPHILS NFR BLD AUTO: 53 %
NRBC # BLD AUTO: 0 10E3/UL
NRBC BLD AUTO-RTO: 0 /100
PLATELET # BLD AUTO: 264 10E3/UL (ref 150–450)
POTASSIUM SERPL-SCNC: 4 MMOL/L (ref 3.4–5.3)
PROT SERPL-MCNC: 7.6 G/DL (ref 6.4–8.3)
RBC # BLD AUTO: 4.9 10E6/UL (ref 3.8–5.2)
SODIUM SERPL-SCNC: 140 MMOL/L (ref 135–145)
WBC # BLD AUTO: 8.5 10E3/UL (ref 4–11)

## 2024-10-29 PROCEDURE — 85004 AUTOMATED DIFF WBC COUNT: CPT

## 2024-10-29 PROCEDURE — G0279 TOMOSYNTHESIS, MAMMO: HCPCS | Performed by: RADIOLOGY

## 2024-10-29 PROCEDURE — 77066 DX MAMMO INCL CAD BI: CPT | Performed by: RADIOLOGY

## 2024-10-29 PROCEDURE — 82670 ASSAY OF TOTAL ESTRADIOL: CPT

## 2024-10-29 PROCEDURE — 36415 COLL VENOUS BLD VENIPUNCTURE: CPT

## 2024-10-29 PROCEDURE — 74177 CT ABD & PELVIS W/CONTRAST: CPT | Performed by: STUDENT IN AN ORGANIZED HEALTH CARE EDUCATION/TRAINING PROGRAM

## 2024-10-29 PROCEDURE — 71260 CT THORAX DX C+: CPT | Performed by: STUDENT IN AN ORGANIZED HEALTH CARE EDUCATION/TRAINING PROGRAM

## 2024-10-29 PROCEDURE — 84155 ASSAY OF PROTEIN SERUM: CPT

## 2024-10-29 RX ORDER — IOPAMIDOL 755 MG/ML
83 INJECTION, SOLUTION INTRAVASCULAR ONCE
Status: COMPLETED | OUTPATIENT
Start: 2024-10-29 | End: 2024-10-29

## 2024-10-29 RX ADMIN — IOPAMIDOL 83 ML: 755 INJECTION, SOLUTION INTRAVASCULAR at 13:27

## 2024-10-29 NOTE — NURSING NOTE
Chief Complaint   Patient presents with    Blood Draw     Vpt blood draw by lab RN     Venipuncture labs drawn by lab RN.    Es Kulkarni RN

## 2024-10-29 NOTE — DISCHARGE INSTRUCTIONS

## 2024-10-31 ENCOUNTER — PATIENT OUTREACH (OUTPATIENT)
Dept: ONCOLOGY | Facility: CLINIC | Age: 59
End: 2024-10-31
Payer: COMMERCIAL

## 2024-10-31 ENCOUNTER — NURSE TRIAGE (OUTPATIENT)
Dept: ONCOLOGY | Facility: CLINIC | Age: 59
End: 2024-10-31
Payer: COMMERCIAL

## 2024-10-31 DIAGNOSIS — Z17.0 MALIGNANT NEOPLASM OF BREAST IN FEMALE, ESTROGEN RECEPTOR POSITIVE, UNSPECIFIED LATERALITY, UNSPECIFIED SITE OF BREAST (H): Primary | ICD-10-CM

## 2024-10-31 DIAGNOSIS — K76.9 LIVER LESION, LEFT LOBE: ICD-10-CM

## 2024-10-31 DIAGNOSIS — C50.919 MALIGNANT NEOPLASM OF BREAST IN FEMALE, ESTROGEN RECEPTOR POSITIVE, UNSPECIFIED LATERALITY, UNSPECIFIED SITE OF BREAST (H): Primary | ICD-10-CM

## 2024-10-31 DIAGNOSIS — K76.9 LIVER LESION, RIGHT LOBE: ICD-10-CM

## 2024-10-31 LAB — RADIOLOGIST FLAGS: NORMAL

## 2024-10-31 NOTE — TELEPHONE ENCOUNTER
Incidental Finding CT CAP on 10/29:  1. Multiple hypodense lesions diffusely involving both lobes of the  liver, larger lesions showing fluid attenuation while smaller lesions  are too small to characterize; findings are indeterminate and may  represent multiple cysts/biliary hamartoma; neoplastic disease is  difficult to exclude, recommend correlation with prior imaging if  available and/or MRI liver for further evaluation.     2. Few scattered sub-6 mm pulmonary nodules, indeterminate, consider  correlation with prior imaging if available and/or short-term  follow-up CT chest for further evaluation.    Message routed to Care Team.

## 2024-10-31 NOTE — PROGRESS NOTES
[facility]: Cancer Care                                                                                          Call to discuss C scan results and Dr Alas' recommendation for Liver MRI.   Requested return call to discus.    Meliza Shine MSN, RN ,OCN  Lead RN Care Coordinator - [facility]  [phone]

## 2025-02-19 ENCOUNTER — NURSE TRIAGE (OUTPATIENT)
Dept: FAMILY MEDICINE | Facility: CLINIC | Age: 60
End: 2025-02-19

## 2025-02-19 NOTE — TELEPHONE ENCOUNTER
"Patient calling to reestablish care due to new insurance  Requesting appointment with S.N.    Has been experiencing urinary problem  \"My bladder is retaining urine\" and leaking after urination   Intermittent pain   Denies fever, flank pain  Advised to schedule today  No available appointments at the [facility]  Advised urgent care   Patient requesting to schedule tomorrow with SBelemN. instead  Scheduled  RN provided rationale for same day disposition   Patient verbalized understanding and will call back if new or worsening symptoms prior to appointment  Rosamaria Tejada RN    Reason for Disposition   Urinating more frequently than usual (i.e., frequency) OR new-onset of the feeling of an urgent need to urinate (i.e., urgency)    Additional Information   Negative: Shock suspected (e.g., cold/pale/clammy skin, too weak to stand, low BP, rapid pulse)   Negative: Sounds like a life-threatening emergency to the triager   Negative: Followed a female genital area injury (e.g., labia, vagina, vulva)   Negative: Followed a male genital area injury (penis, scrotum)   Negative: Vaginal discharge   Negative: Pus (white, yellow) or bloody discharge from end of penis   Negative: Pain or burning with passing urine (urination) and pregnant   Negative: Pain or burning with passing urine (urination) and female   Negative: Pain or burning with passing urine (urination) and male   Negative: Pain or itching in the vulvar area   Negative: Pain in scrotum is main symptom   Negative: Blood in the urine is main symptom   Negative: Symptoms arising from use of a urinary catheter (e.g., coude, [place])   Negative: Unable to urinate (or only a few drops) > 4 hours and bladder feels very full (e.g., palpable bladder or strong urge to urinate)   Negative: Decreased urination and drinking very little and dehydration suspected (e.g., dark urine, no urine > 12 hours, very dry mouth, very lightheaded)   Negative: Patient sounds very sick or weak " to the triager   Negative: Fever > 100.4 F  (38.0 C)   Negative: Side (flank) or lower back pain present   Negative: Bad or foul-smelling urine    Protocols used: Urinary Symptoms-A-OH

## 2025-02-19 NOTE — TELEPHONE ENCOUNTER
Reason for Call:  Appointment Request    Patient requesting this type of appt:  UTI, DECLINED E VISIT, VIRTUAL VISIT    Requested provider: Es Tracy    Reason patient unable to be scheduled: Not within requested timeframe    When does patient want to be seen/preferred time: Same day    Comments: PT WOULD LIKE TO BE SEEN ASAP    Could we [facility] this [facility] or would you prefer to receive a phone call?:   Patient would prefer a phone call   Okay to leave a detailed message?: Yes at Home number on file [phone] (home)    Call taken on 2/19/2025 at 9:15 AM by Brisa Zimmerman

## 2025-02-20 ENCOUNTER — OFFICE VISIT (OUTPATIENT)
Dept: FAMILY MEDICINE | Facility: CLINIC | Age: 60
End: 2025-02-20
Payer: COMMERCIAL

## 2025-02-20 VITALS
HEART RATE: 98 BPM | OXYGEN SATURATION: 96 % | TEMPERATURE: 97.2 F | DIASTOLIC BLOOD PRESSURE: 80 MMHG | SYSTOLIC BLOOD PRESSURE: 129 MMHG | BODY MASS INDEX: 29.53 KG/M2 | WEIGHT: 156.3 LBS

## 2025-02-20 DIAGNOSIS — F33.9 RECURRENT MAJOR DEPRESSIVE DISORDER, REMISSION STATUS UNSPECIFIED: ICD-10-CM

## 2025-02-20 DIAGNOSIS — D05.10 DUCTAL CARCINOMA IN SITU (DCIS) OF BREAST, UNSPECIFIED LATERALITY: ICD-10-CM

## 2025-02-20 DIAGNOSIS — K59.03 DRUG-INDUCED CONSTIPATION: ICD-10-CM

## 2025-02-20 DIAGNOSIS — R35.0 URINARY FREQUENCY: ICD-10-CM

## 2025-02-20 DIAGNOSIS — Z12.11 SCREEN FOR COLON CANCER: Primary | ICD-10-CM

## 2025-02-20 LAB
ALBUMIN UR-MCNC: NEGATIVE MG/DL
APPEARANCE UR: CLEAR
BACTERIA #/AREA URNS HPF: ABNORMAL /HPF
BILIRUB UR QL STRIP: NEGATIVE
COLOR UR AUTO: YELLOW
GLUCOSE UR STRIP-MCNC: NEGATIVE MG/DL
HGB UR QL STRIP: NEGATIVE
KETONES UR STRIP-MCNC: NEGATIVE MG/DL
LEUKOCYTE ESTERASE UR QL STRIP: NEGATIVE
NITRATE UR QL: NEGATIVE
PH UR STRIP: 7 [PH] (ref 5–7)
RBC #/AREA URNS AUTO: ABNORMAL /HPF
SP GR UR STRIP: 1.01 (ref 1–1.03)
SQUAMOUS #/AREA URNS AUTO: ABNORMAL /LPF
UROBILINOGEN UR STRIP-ACNC: 0.2 E.U./DL
WBC #/AREA URNS AUTO: ABNORMAL /HPF

## 2025-02-20 PROCEDURE — 3074F SYST BP LT 130 MM HG: CPT | Performed by: FAMILY MEDICINE

## 2025-02-20 PROCEDURE — 1126F AMNT PAIN NOTED NONE PRSNT: CPT | Performed by: FAMILY MEDICINE

## 2025-02-20 PROCEDURE — 3079F DIAST BP 80-89 MM HG: CPT | Performed by: FAMILY MEDICINE

## 2025-02-20 PROCEDURE — 90673 RIV3 VACCINE NO PRESERV IM: CPT | Performed by: FAMILY MEDICINE

## 2025-02-20 PROCEDURE — 90471 IMMUNIZATION ADMIN: CPT | Performed by: FAMILY MEDICINE

## 2025-02-20 PROCEDURE — 99204 OFFICE O/P NEW MOD 45 MIN: CPT | Mod: 25 | Performed by: FAMILY MEDICINE

## 2025-02-20 PROCEDURE — 81001 URINALYSIS AUTO W/SCOPE: CPT | Performed by: FAMILY MEDICINE

## 2025-02-20 RX ORDER — BUPROPION HYDROCHLORIDE 150 MG/1
150 TABLET, FILM COATED, EXTENDED RELEASE ORAL 2 TIMES DAILY
Qty: 180 TABLET | Refills: 3 | Status: SHIPPED | OUTPATIENT
Start: 2025-02-20

## 2025-02-20 ASSESSMENT — ASTHMA QUESTIONNAIRES
QUESTION_3 LAST FOUR WEEKS HOW OFTEN DID YOUR ASTHMA SYMPTOMS (WHEEZING, COUGHING, SHORTNESS OF BREATH, CHEST TIGHTNESS OR PAIN) WAKE YOU UP AT NIGHT OR EARLIER THAN USUAL IN THE MORNING: NOT AT ALL
QUESTION_5 LAST FOUR WEEKS HOW WOULD YOU RATE YOUR ASTHMA CONTROL: SOMEWHAT CONTROLLED
QUESTION_2 LAST FOUR WEEKS HOW OFTEN HAVE YOU HAD SHORTNESS OF BREATH: THREE TO SIX TIMES A WEEK
ACT_TOTALSCORE: 17
QUESTION_4 LAST FOUR WEEKS HOW OFTEN HAVE YOU USED YOUR RESCUE INHALER OR NEBULIZER MEDICATION (SUCH AS ALBUTEROL): ONE OR TWO TIMES PER DAY
ACT_TOTALSCORE: 17
QUESTION_1 LAST FOUR WEEKS HOW MUCH OF THE TIME DID YOUR ASTHMA KEEP YOU FROM GETTING AS MUCH DONE AT WORK, SCHOOL OR AT HOME: A LITTLE OF THE TIME

## 2025-02-20 ASSESSMENT — PAIN SCALES - GENERAL: PAINLEVEL_OUTOF10: NO PAIN (0)

## 2025-02-20 ASSESSMENT — PATIENT HEALTH QUESTIONNAIRE - PHQ9
SUM OF ALL RESPONSES TO PHQ QUESTIONS 1-9: 5
SUM OF ALL RESPONSES TO PHQ QUESTIONS 1-9: 5
10. IF YOU CHECKED OFF ANY PROBLEMS, HOW DIFFICULT HAVE THESE PROBLEMS MADE IT FOR YOU TO DO YOUR WORK, TAKE CARE OF THINGS AT HOME, OR GET ALONG WITH OTHER PEOPLE: SOMEWHAT DIFFICULT

## 2025-02-20 NOTE — NURSING NOTE
Prior to immunization administration, verified patients identity using patient s name and date of birth. Please see Immunization Activity for additional information.     Screening Questionnaire for Adult Immunization    Are you sick today?   No   Do you have allergies to medications, food, a vaccine component or latex?   No   Have you ever had a serious reaction after receiving a vaccination?   No   Do you have a long-term health problem with heart, lung, kidney, or metabolic disease (e.g., diabetes), asthma, a blood disorder, no spleen, complement component deficiency, a cochlear implant, or a spinal fluid leak?  Are you on long-term aspirin therapy?   No   Do you have cancer, leukemia, HIV/AIDS, or any other immune system problem?   No   Do you have a parent, brother, or sister with an immune system problem?   No   In the past 3 months, have you taken medications that affect  your immune system, such as prednisone, other steroids, or anticancer drugs; drugs for the treatment of rheumatoid arthritis, Crohn s disease, or psoriasis; or have you had radiation treatments?   No   Have you had a seizure, or a brain or other nervous system problem?   No   During the past year, have you received a transfusion of blood or blood    products, or been given immune (gamma) globulin or antiviral drug?   No   For women: Are you pregnant or is there a chance you could become       pregnant during the next month?   No   Have you received any vaccinations in the past 4 weeks?   No     Immunization questionnaire answers were all negative.      Patient instructed to remain in clinic for 15 minutes afterwards, and to report any adverse reactions.     Screening performed by Jayden hCo MA on 2/20/2025 at 2:46 PM.

## 2025-02-20 NOTE — PROGRESS NOTES
{PROVIDER CHARTING PREFERENCE:401559}    Kyra Mg is a 60 year old, presenting for the following health issues:  Urinary Problem        2/20/2025     1:56 PM   Additional Questions   Roomed by Jenna       Via the Health Maintenance questionnaire, the patient has reported the following services have been completed -Cervical Cancer Screening: [facility] 2022-06-05-Colonscopy: peter 2022-06-22, this information has been sent to the abstraction team.  History of Present Illness       Reason for visit:  Bladder  Symptom onset:  More than a month  Symptoms include:  Will expkain  Symptom intensity:  Mild  Symptom progression:  Staying the same  Had these symptoms before:  Yes  Has tried/received treatment for these symptoms:  No  What makes it worse:  Too long  What makes it better:  No   She is taking medications regularly.   Has had treatment for breast cancer  Has had leakage was thrust into menopause with treatment  She feels since starting this medication has had lots of vaginal dryness  She has been using estrogen suppository and when she skips it she feels it right away  Urinary urge and frequency worsens    She has also noted that it feels she has a UTI  She will void and then has more urine noted a large reserve    Has had constipation:  Recently has tried psyllium  Was trying miralax - this helped for a while  Hard to remember to do this daily  Has been eating pysillium pudding and eats this at night gets really bloated    {MA/LPN/RN Pre-Provider Visit Orders- hCG/UA/Strep (Optional):124063}  Genitourinary - Female  Onset/Duration: More than a year ago   Description:   Painful urination (Dysuria): No            Frequency: YES  Blood in urine (Hematuria): No  Delay in urine (Hesitency): YES  Intensity: mild  Progression of Symptoms:  intermittent  Accompanying Signs & Symptoms:  Fever/chills: No  Flank pain: No  Nausea and vomiting: No  Vaginal symptoms: none  Abdominal/Pelvic Pain:  No  History:   History of frequent UTI s: No  History of kidney stones: No  Sexually Active: No  Possibility of pregnancy: No  Precipitating or alleviating factors: None  Therapies tried and outcome:  None   {additonal problems for provider to add (Optional):824500}    {ROS Picklists (Optional):961591}      Objective    There were no vitals taken for this visit.  There is no height or weight on file to calculate BMI.  Physical Exam   {Exam List (Optional):146794}    {Diagnostic Test Results (Optional):903839}        Signed Electronically by: Es Tracy MD  {Email feedback regarding this note to [email or web address]   :[id number]}

## 2025-02-20 NOTE — PATIENT INSTRUCTIONS
These bring water into the gut:  Peaches pears prunes or their juices  Lots of water  Miralax  - half capsules per day     This bulks up your stools:  Fiber - many different types - find one you like and try small doses teaspoon or 2 and increased slowly     Things that push the stools to move:  Stimulants - colace, senna - use these as needed if no stool in 3-4 days    You can  try a glycerin suppository - this stimulates from below    This is a bowel cleanout regimen only if you need this:      Bowel Clean Out For Constipation: Do on one day at home when you don't need to go anywhere   the following, available without a prescription:  Miralax (generic is fine)  Bisacodyl (generic Dulcolax pills)  Also  any flavor of  regular Gatorade (NOT sugar free Gatorade)     Start a clear liquid diet in the morning of the clean out (any fluid you can see through as well as jello). Mix the Miralax/Gatorade according to weight below.  Start the clean out any time before noon      Children over 75 pounds  Take 3 bisacodyl (Dulcolax) tablets with 8 oz. of clear liquid. Bury the pills in soft food if your child cannot swallow them whole.  Mix 15 capfuls of Miralax into 64 oz of PowerAde or Gatorade.  About 30 minutes after taking the bisacodyl, drink 8-12oz. of the Miralax-electrolyte solution mixture every 15-20 minutes until the entire 64 oz are consumed. Slow down a little if your child is very nauseous  Resume a normal diet slowly after the clean out is complete     What to expect from the clean out: Stools should be quite loose or watery, hopefully they will become lighter in color towards the end of the stool production.  Stool production can take several hours or longer to begin after the clean out is complete.     Our usual protocol is:   Children under 50lbs -- 2 dulcolax tabs (10mg) + 7.5 capfuls miralax in 32oz Gatorade/Powerade   Children 50-75lb -- 2 dulcolax tabs (10mg) + 11.5 capfuls miralax in 45oz  Gatorade/Powerade           Avoid bananas - they constipate

## 2025-02-21 PROBLEM — D12.6 ADENOMATOUS COLON POLYP: Status: ACTIVE | Noted: 2025-02-21

## 2025-02-26 NOTE — RESULT ENCOUNTER NOTE
Hello,    The urine test showed no signs of infection.  There were some squamous cells and those usually mean skin cells from the urethra that usually fall into the cup as you pee.  Overall nothing to treat however if you are having lots of symptoms we can talk about vaginal estrogen.    Es Tracy MD

## 2025-04-10 ENCOUNTER — THERAPY VISIT (OUTPATIENT)
Age: 60
End: 2025-04-10
Payer: COMMERCIAL

## 2025-04-10 DIAGNOSIS — K59.03 DRUG INDUCED CONSTIPATION: Primary | ICD-10-CM
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
## 2025-04-17 ENCOUNTER — THERAPY VISIT (OUTPATIENT)
Age: 60
End: 2025-04-17
Payer: COMMERCIAL

## 2025-04-17 DIAGNOSIS — K59.03 DRUG INDUCED CONSTIPATION: Primary | ICD-10-CM

## 2025-05-01 ENCOUNTER — TRANSFERRED RECORDS (OUTPATIENT)
Dept: HEALTH INFORMATION MANAGEMENT | Facility: CLINIC | Age: 60
End: 2025-05-01

## 2025-05-01 ENCOUNTER — ONCOLOGY VISIT (OUTPATIENT)
Dept: ONCOLOGY | Facility: CLINIC | Age: 60
End: 2025-05-01
Attending: INTERNAL MEDICINE
Payer: COMMERCIAL

## 2025-05-01 VITALS
WEIGHT: 155.4 LBS | TEMPERATURE: 98.5 F | RESPIRATION RATE: 12 BRPM | BODY MASS INDEX: 29.36 KG/M2 | HEART RATE: 91 BPM | OXYGEN SATURATION: 99 % | SYSTOLIC BLOOD PRESSURE: 124 MMHG | DIASTOLIC BLOOD PRESSURE: 67 MMHG

## 2025-05-01 DIAGNOSIS — Z17.0 MALIGNANT NEOPLASM OF BREAST IN FEMALE, ESTROGEN RECEPTOR POSITIVE, UNSPECIFIED LATERALITY, UNSPECIFIED SITE OF BREAST (H): Primary | ICD-10-CM

## 2025-05-01 DIAGNOSIS — C50.919 MALIGNANT NEOPLASM OF BREAST IN FEMALE, ESTROGEN RECEPTOR POSITIVE, UNSPECIFIED LATERALITY, UNSPECIFIED SITE OF BREAST (H): Primary | ICD-10-CM

## 2025-05-01 DIAGNOSIS — K76.9 LIVER LESION: ICD-10-CM

## 2025-05-01 PROCEDURE — 99213 OFFICE O/P EST LOW 20 MIN: CPT | Performed by: INTERNAL MEDICINE

## 2025-05-01 ASSESSMENT — PAIN SCALES - GENERAL: PAINLEVEL_OUTOF10: MILD PAIN (3)

## 2025-05-01 NOTE — PROGRESS NOTES
May 1, 2025    Oncology clinic follow up:      CC: Left breast DCIS status post lumpectomy 2024, pTisNx, grade 2 ER % s/p resection, declined radiation, did not tolerate anastrazole    HPI: Mr/Ms Haritha Benavides is a 59 y.o. yr old female patient with history of asthma, anxiety / depression, GERD seen for management of left breast DCIS  2024 bilateral screening mammogram with indeterminate calcifications in left breast at 2 o'clock position. Asymmetry in right breast at the 11 o'clock position  2024 diagnostic bilateral mammogram with scattered areas of fibroglandular density, 0.5 x 0.5 cm heterogeneous calcifications at 2 o'clock left breast. Persistent focal asymmetry at 10 o'clock. right breast. Right breast ultrasound with no suspicious mass, sister architectural distortion.  2024 left breast 2 o'clock. Biopsy with a DCIS, grade 2 without necrosis. Calcifications associated with DCIS. ER %  2024 right breast 11 o'clock biopsy with benign breast tissue with a apocrine cysts  2024 underwent lumpectomy by Dr. Alfaro. Pathology with DCIS, grade 2, necrosis present all margins negative for DCIS ( 3 mm) final pathologic stage P TisNx  Referred to radiation oncology.  DCIS recurrence score risk 3%.  No radiation.  She did a trial of anastrazole x 1 month.       She has had significant menopausal symptoms including the following:  - jaw clenching; now being treated with TMJ specialist  - no libido, vaginal dryness --> mildly improved with vaginal estrogen  - crying  -arthralgias and myalgias, difficulty concentrating,     During this time period, she has also gone through a lot of stressors - both parents , she lives by [facility].    Has been on hormone replacement therapy for almost 8 years prior to her DCIS diagnosis. Stopped after diagnosis of DCIS.     Established care with Dr. Alas on 10/2024.     Interval History:  Haritha is here for follow up. She has been  having arthralgias in her hand/fingers which started with endocrine therapy but did not improve after discontinuing treatment. She had several questions about HRT and also considering phytoestrogen. She was also wondering if she can use supplements for symptom management.     Reports decreased ROM and pain on left shoulder. Also has sharp intermittent neuropathic pain near the breast surgical site which also worsens with left shoulder movements. She never saw PT before. No on any pain meds.     She has been using vaginal estradiol fro vaginal dryness and it's helping.      She saw Dina Jorge  on 10/2024  from cancer survivorship clinic and she feels that this was helpful. She is supposed to follow up again sometime soon.     Denies any other concerns. She has been active and has been exercising daily.       ROS: 10 point ROS neg other than the symptoms noted above in the HPI.      Past Medical History:   Diagnosis Date   Anxiety ([facility])   Asthma ([facility])   Breast cancer ([facility]) 2/27/24   Depression ([facility])   Gastroesophageal reflux disease 1/1/20   Having tests run   Heartburn   Pap smear abnormality of cervix   30's no treatment   STD (sexually transmitted disease) ([facility])   chlamydia in her 20's   Varicella     Patient Active Problem List   Diagnosis   Eczema   Depression with anxiety ([facility])   Migraine with aura and without status migrainosus, not intractable   Moderate persistent asthma with acute exacerbation ([facility])   Seborrheic dermatitis   Bursitis of hip   Seasonal allergic rhinitis   TMJ (temporomandibular joint disorder)   Serrated adenoma of colon   Gonzalez's esophagus without dysplasia   Malignant neoplasm of left breast in female, estrogen receptor positive ([facility])   Major depressive disorder, recurrent severe without psychotic features ([facility])   Attention-deficit hyperactivity disorder, combined type ([facility])   Attention-deficit hyperactivity disorder, predominantly inattentive type ([facility])       Past Surgical History:    Procedure Laterality Date   LIPOSUCTION 30's   TONSILLECTOMY   WISDOM TEETH EXTRACTION       Social History -   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 No palpable mass on bilateral breast. No LAD.   Lungs: clear  Abd: soft, nt nd + bs    Reviewed labs and imaging    A/P:  59 y/o postmenopausal female with     Tis L breast cancer, stage 0 s/p resection and no further therapy.    Patient has DCIS. The treatment is surgical resection with consideration of radiation treatment.  She underwent surgical resection with clean margins.  Given her low DCIS recurrence score adjuvant radiation was declined. During here initial visit, we discussed the use of antiestrogen therapy in patients with DCIS.  We discussed the use of tamoxifen at 5 mg or 20 mg or the use of endocrine therapy with an aromatase inhibitor such as exemestane have been studied in patients with DCIS.  These clinical trials showing improvement in reducing the risk of local recurrence or contralateral breast cancer recurrence.  They do not however improve overall survival in these patients.  Given the terrible symptoms that Haritha experienced with difficulty concentrating arthralgias myalgias anxiety, we did not recommend that she resume anastrozole.  She was in agreement with this plan.    She still has arthralgias in her hand/fingers which she attributes to her previous endocrine therapy use. She was interested to learn more about HRT and phytoestrogen supplements. We  discussed that given her DCIS was strong ER +, we recommended against HRT. Phytoestrogen in food is ok since the absorption is minimal but we do not have enough data on phytoestrone supplements alone.  She is interested to see integrative medicine physician to discuss supplement options. Provided referral to Dr. Andrade at [facility] who specializes in integrative medicine specifically for breast cancer patients.     Her most recent mammogram 10/29/2024 was benign. We will continue mammogram for surveillance every 12 months based on NCCN guidelines. Next one due in 10/2025.    For other symptom management we discussed the importance of regular exercise, nutrition, and holistic health with meditation.  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      She was given additional supportive resources from our survivorship clinic as well as [facility] webpage. Schedule follow up with Dina Jorge in 6 months    2. Anxiety, controlled on Adderall and Bupropion.    3. Multiple hypodense liver lesions: CT scan from 10/2024 showed multiple liver lesion. Will order MRI liver today.     4. Pulm nodules: Repeat CT chest in 6 months (~Oct 2025)    5. Left shoulder: Decreased ROM. Referral provided to PT.     Follow up at cancer survivorship clinic in 6 months and Dr. Alas in 12 months.     Patient was seen and plan discussed with Dr. Bishop SELLERSBS  Oncology Fellow     Addendum:  Pt was seen and evaluated by me with Dr Tboias.  We reviewed imaging, labs and prior notes.    I reviewed with Haritha that based on her history and her physical examination today she has no signs or symptoms of recurrence.  She was treated with resection for DCIS.  She remains off of any endocrine therapy.  We discussed the recommendations for annual mammography.  There is no data to support additional aggressive imaging every 6 months unless the patient has a genetic abnormality predisposing them to recurrent cancer.  We discussed the recommendations  for clinical examination every 6 months with imaging annually.    She has many questions today about supplements.  I suggested she see Dr. Cheyanne Galvez in integrative health.  I do not recommend HRT.  We discussed having her talk to integrative specialist regarding martha estrogen supplements.    Her prior imaging demonstrated hypodense liver lesions likely consistent with hemangiomas.  Follow-up MRI was recommended.  This was ordered today.    She will need a repeat chest CT in 6 months.    She was referred to physical therapy for decreased range of motion involving the left shoulder.    Rakel Alas MD     The longitudinal plan of care for the diagnosis(es)/condition(s) as documented were addressed during this visit. Due to the added complexity in care, I will continue to support Haritha in the subsequent management and with ongoing continuity of care.

## 2025-05-01 NOTE — NURSING NOTE
"Oncology Rooming Note    May 1, 2025 10:52 AM   Haritha Benavides is a 60 year old female who presents for:    Chief Complaint   Patient presents with    Oncology Clinic Visit     Breast cancer     Initial Vitals: LMP  (LMP Unknown)  Estimated body mass index is 29.53 kg/m  as calculated from the following:    Height as of 10/14/24: 1.549 m (5' 1\").    Weight as of 2/20/25: 70.9 kg (156 lb 4.8 oz). There is no height or weight on file to calculate BSA.  Data Unavailable Comment: Data Unavailable   No LMP recorded (lmp unknown). Patient is postmenopausal.  Allergies reviewed: Yes  Medications reviewed: Yes    Medications: Medication refills not needed today.  Pharmacy name entered into EPIC:    [id number] [place], [place] - [address] [place] [address] & [facility] [place] [place] - [address] [place], [place] [id number] - [place], [place] - [address] [facility] [place], [place] - [address]    Frailty Screening:   Is the patient here for a new oncology consult visit in cancer care? 2. No    PHQ9:  Did this patient require a PHQ9?: No      Clinical concerns: none.       Shayna Brown              "

## 2025-05-01 NOTE — LETTER
2025      Haritha Benavides  [address]  [facility] [address]      Dear Colleague,    Thank you for referring your patient, Haritha Benavides, to the [facility]. Please see a copy of my visit note below.    May 1, 2025    Oncology clinic follow up:      CC: Left breast DCIS status post lumpectomy 2024, pTisNx, grade 2 ER % s/p resection, declined radiation, did not tolerate anastrazole    HPI: Mr/Ms Haritha Benavides is a 59 y.o. yr old female patient with history of asthma, anxiety / depression, GERD seen for management of left breast DCIS  2024 bilateral screening mammogram with indeterminate calcifications in left breast at 2 o'clock position. Asymmetry in right breast at the 11 o'clock position  2024 diagnostic bilateral mammogram with scattered areas of fibroglandular density, 0.5 x 0.5 cm heterogeneous calcifications at 2 o'clock left breast. Persistent focal asymmetry at 10 o'clock. right breast. Right breast ultrasound with no suspicious mass, sister architectural distortion.  2024 left breast 2 o'clock. Biopsy with a DCIS, grade 2 without necrosis. Calcifications associated with DCIS. ER %  2024 right breast 11 o'clock biopsy with benign breast tissue with a apocrine cysts  2024 underwent lumpectomy by Dr. Alfaro. Pathology with DCIS, grade 2, necrosis present all margins negative for DCIS ( 3 mm) final pathologic stage P TisNx  Referred to radiation oncology.  DCIS recurrence score risk 3%.  No radiation.  She did a trial of anastrazole x 1 month.       She has had significant menopausal symptoms including the following:  - jaw clenching; now being treated with TMJ specialist  - no libido, vaginal dryness --> mildly improved with vaginal estrogen  - crying  -arthralgias and myalgias, difficulty concentrating,     During this time period, she has also gone through a lot of stressors - both parents , she lives by [place]  [facility].    Has been on hormone replacement therapy for almost 8 years prior to her DCIS diagnosis. Stopped after diagnosis of DCIS.     Established care with Dr. Alas on 10/2024.     Interval History:  Haritha is here for follow up. She has been having arthralgias in her hand/fingers which started with endocrine therapy but did not improve after discontinuing treatment. She had several questions about HRT and also considering phytoestrogen. She was also wondering if she can use supplements for symptom management.     Reports decreased ROM and pain on left shoulder. Also has sharp intermittent neuropathic pain near the breast surgical site which also worsens with left shoulder movements. She never saw PT before. No on any pain meds.     She has been using vaginal estradiol fro vaginal dryness and it's helping.      She saw Dina Jorge  on 10/2024  from cancer survivorship clinic and she feels that this was helpful. She is supposed to follow up again sometime soon.     Denies any other concerns. She has been active and has been exercising daily.       ROS: 10 point ROS neg other than the symptoms noted above in the HPI.      Past Medical History:   Diagnosis Date   Anxiety (HRC)   Asthma (HRC)   Breast cancer (HRC) 2/27/24   Depression (HRC)   Gastroesophageal reflux disease 1/1/20   Having tests run   Heartburn   Pap smear abnormality of cervix   30's no treatment   STD (sexually transmitted disease) (HRC)   chlamydia in her 20's   Varicella     Patient Active Problem List   Diagnosis   Eczema   Depression with anxiety (HRC)   Migraine with aura and without status migrainosus, not intractable   Moderate persistent asthma with acute exacerbation (HRC)   Seborrheic dermatitis   Bursitis of hip   Seasonal allergic rhinitis   TMJ (temporomandibular joint disorder)   Serrated adenoma of colon   Gonzalez's esophagus without dysplasia   Malignant neoplasm of left breast in female, estrogen receptor positive (HRC)   Major  depressive disorder, recurrent severe without psychotic features (HRC)   Attention-deficit hyperactivity disorder, combined type (HRC)   Attention-deficit hyperactivity disorder, predominantly inattentive type (HRC)       Past Surgical History:   Procedure Laterality Date   LIPOSUCTION 30's   TONSILLECTOMY   WISDOM TEETH EXTRACTION       Social History -   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 No palpable mass on bilateral breast. No LAD.   Lungs: clear  Abd: soft, nt nd + bs    Reviewed labs and imaging    A/P:  59 y/o postmenopausal female with     Tis L breast cancer, stage 0 s/p resection and no further therapy.    Patient has DCIS. The treatment is surgical resection with consideration of radiation treatment.  She underwent surgical resection with clean margins.  Given her low DCIS recurrence score adjuvant radiation was declined. During here initial visit, we discussed the use of antiestrogen therapy in patients with DCIS.  We discussed the use of tamoxifen at 5 mg or 20 mg or the use of endocrine therapy with an aromatase inhibitor such as exemestane have been studied in patients with DCIS.  These clinical trials showing improvement in reducing the risk of local recurrence or contralateral breast cancer recurrence.  They do not however improve overall survival in these patients.  Given the terrible symptoms that Haritha experienced with difficulty concentrating arthralgias myalgias anxiety, we did not recommend that she  resume anastrozole.  She was in agreement with this plan.    She still has arthralgias in her hand/fingers which she attributes to her previous endocrine therapy use. She was interested to learn more about HRT and phytoestrogen supplements. We discussed that given her DCIS was strong ER +, we recommended against HRT. Phytoestrogen in food is ok since the absorption is minimal but we do not have enough data on phytoestrone supplements alone.  She is interested to see integrative medicine physician to discuss supplement options. Provided referral to Dr. Andrade at [facility] who specializes in integrative medicine specifically for breast cancer patients.     Her most recent mammogram 10/29/2024 was benign. We will continue mammogram for surveillance every 12 months based on NCCN guidelines. Next one due in 10/2025.    For other symptom management we discussed the importance of regular exercise, nutrition, and holistic health with meditation.  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      She was given additional supportive resources from our survivorship clinic as well as [facility] webpage. Schedule follow up with Dina Jorge in 6 months    2. Anxiety, controlled on Adderall and Bupropion.    3. Multiple hypodense liver lesions: CT scan from 10/2024 showed multiple liver lesion. Will order MRI liver today.     4. Pulm nodules: Repeat CT chest in 6 months (~Oct 2025)    5. Left shoulder: Decreased ROM. Referral provided to PT.     Follow up at cancer survivorship clinic in 6 months and Dr. Alas in 12 months.     Patient was seen and plan discussed with Dr. Bishop Tobias MBBS  Oncology Fellow     Addendum:  Pt was seen and evaluated by me with Dr Tobias.  We reviewed imaging, labs and prior notes.    I reviewed with Haritha that based on her history and her physical examination today she has no signs or symptoms of recurrence.  She was treated with resection for DCIS.  She remains off of any endocrine therapy.  We  discussed the recommendations for annual mammography.  There is no data to support additional aggressive imaging every 6 months unless the patient has a genetic abnormality predisposing them to recurrent cancer.  We discussed the recommendations for clinical examination every 6 months with imaging annually.    She has many questions today about supplements.  I suggested she see Dr. Cheyanne Galvez in [facility].  I do not recommend HRT.  We discussed having her talk to integrative specialist regarding martha estrogen supplements.    Her prior imaging demonstrated hypodense liver lesions likely consistent with hemangiomas.  Follow-up MRI was recommended.  This was ordered today.    She will need a repeat chest CT in 6 months.    She was referred to physical therapy for decreased range of motion involving the left shoulder.    Rakel Alas MD     The longitudinal plan of care for the diagnosis(es)/condition(s) as documented were addressed during this visit. Due to the added complexity in care, I will continue to support Haritha in the subsequent management and with ongoing continuity of care.          Again, thank you for allowing me to participate in the care of your patient.        Sincerely,        Rakel Alas MD    Electronically signed

## 2025-05-07 ENCOUNTER — THERAPY VISIT (OUTPATIENT)
Dept: PHYSICAL THERAPY | Facility: CLINIC | Age: 60
End: 2025-05-07
Attending: INTERNAL MEDICINE
Payer: COMMERCIAL

## 2025-05-07 DIAGNOSIS — C50.919 MALIGNANT NEOPLASM OF BREAST IN FEMALE, ESTROGEN RECEPTOR POSITIVE, UNSPECIFIED LATERALITY, UNSPECIFIED SITE OF BREAST (H): ICD-10-CM

## 2025-05-07 DIAGNOSIS — M25.512 PAIN IN JOINT OF LEFT SHOULDER: Primary | ICD-10-CM

## 2025-05-07 DIAGNOSIS — Z17.0 MALIGNANT NEOPLASM OF BREAST IN FEMALE, ESTROGEN RECEPTOR POSITIVE, UNSPECIFIED LATERALITY, UNSPECIFIED SITE OF BREAST (H): ICD-10-CM

## 2025-05-07 PROCEDURE — 97161 PT EVAL LOW COMPLEX 20 MIN: CPT | Mod: GP | Performed by: PHYSICAL THERAPIST

## 2025-05-07 PROCEDURE — 97110 THERAPEUTIC EXERCISES: CPT | Mod: GP | Performed by: PHYSICAL THERAPIST

## 2025-05-07 PROCEDURE — 97140 MANUAL THERAPY 1/> REGIONS: CPT | Mod: GP | Performed by: PHYSICAL THERAPIST

## 2025-05-07 ASSESSMENT — ACTIVITIES OF DAILY LIVING (ADL)
TOUCHING_THE_BACK_OF_YOUR_NECK: 5
CARRYING_A_HEAVY_OBJECT_OF_10_POUNDS: 7
AT_ITS_WORST?: 7
WHEN_LYING_ON_THE_INVOLVED_SIDE: 7
PUSHING_WITH_THE_INVOLVED_ARM: 7
PUTTING_ON_AN_UNDERSHIRT_OR_A_PULLOVER_SWEATER: 5
PLACING_AN_OBJECT_ON_A_HIGH_SHELF: 7
PLEASE_INDICATE_YOR_PRIMARY_REASON_FOR_REFERRAL_TO_THERAPY:: SHOULDER
REACHING_FOR_SOMETHING_ON_A_HIGH_SHELF: 7
PUTTING_ON_YOUR_PANTS: 3
PUTTING_ON_A_SHIRT_THAT_BUTTONS_DOWN_THE_FRONT: 3
WASHING_YOUR_HAIR?: 3
WASHING_YOUR_BACK: 7
REMOVING_SOMETHING_FROM_YOUR_BACK_POCKET: 3

## 2025-05-07 NOTE — PROGRESS NOTES
PHYSICAL THERAPY EVALUATION  Type of Visit: Evaluation       Fall Risk Screen:  Fall screen completed by: PT  Have you fallen 2 or more times in the past year?: No  Have you fallen and had an injury in the past year?: No  Is patient receiving Physical Therapy Services?: No    Subjective         Presenting condition or subjective complaint: Shoulder.  Pt complains of L shoulder pain since her L breast CA surgery on 3/28/2024.  Complains of limited ROM lying on her L side and reaching overhead.    Date of onset:      Relevant medical history:     Past Medical History:   Diagnosis Date    Allergic rhinitis, cause unspecified     claritin all year, helping    Candidiasis of unspecified site     sinus/body... helped with diet changes through naturopath    Depressive disorder, not elsewhere classified     wellbutrin SR (also helped w/ ADD sx's)    Dysmenorrhea     better on OCPs    Mild intermittent asthma     exercise induced, daily albuterol in summer before biking, doesn't need prior to yoga       Dates & types of surgery: Breast lumpectomy   Past Surgical History:   Procedure Laterality Date    HC REMOVAL OF TONSILS,12+ Y/O  Age 14    HC REMOVAL OF TONSILS,<11 Y/O      Description: Tonsillectomy;  Recorded: 10/05/2013;    HC TOOTH EXTRACTION W/FORCEP  Age 15?    wisdom teeth, reaction to anesthesia    LIPOSUCTION (LOCATION)  1996    with quitting smoking; thighs     Prior diagnostic imaging/testing results:     none  Prior therapy history for the same diagnosis, illness or injury: No  seeing pelvic floor PT, none for L shoulder    Prior Level of Function  Transfers:   Ambulation:   ADL:   IADL:     Living Environment  Social support: With a significant other or spouse   Type of home: House; 2-story   Stairs to enter the home: Yes 20 Is there a railing: Yes     Ramp: No   Stairs inside the home: Yes 20 Is there a railing: Yes     Help at home: Other  Equipment owned:       Employment: Yes Officiant  Hobbies/Interests:       Patient goals for therapy: Put in certain positions, lift things and be able to lie on left side    Pain assessment:      Objective   SHOULDER EVALUATION  PAIN: Pain Level at Rest: 3/10  Pain Level with Use: 7/10  Pain Location: shoulder and L side and neck, denies pain down the arm  Pain Quality: Aching, Dull, and Sharp  Pain Frequency: constant  Pain is Worst: activity and certain positions  Pain is Exacerbated By: reaching overhead, reaching out to side, lying on L side  Pain is Relieved By: massages, ice, advil  Pain Progression: Worsened  INTEGUMENTARY (edema, incisions):   POSTURE: L elevated scapula, L forward humeral head, elevated L shoulder compared to R  GAIT:   Weightbearing Status:   Assistive Device(s):   Gait Deviations:   BALANCE/PROPRIOCEPTION:   WEIGHTBEARING ALIGNMENT:   ROM:   (Degrees) Left AROM Left PROM Right AROM  Right PROM   Shoulder Flexion 140 firm endfeel +  152    Shoulder Extension       Shoulder Abduction WNL+ 90 firm endfeel WNL    Shoulder Adduction       Shoulder ext/Internal Rotation T10 WNL T8    Shoulder External Rotation 70 WNL 50    Shoulder Horizontal Abduction       Shoulder Horizontal Adduction       Shoulder Flexion ER       Shoulder Flexion IR       Elbow Extension       Elbow Flexion       Pain:   End feel:     STRENGTH:   Pain: - none + mild ++ moderate +++ severe  Strength Scale: 0-5/5 Left Right   Shoulder Flexion 4 4+   Shoulder Extension     Shoulder Abduction 4- 4+   Shoulder Adduction     Shoulder Internal Rotation 4 4+   Shoulder External Rotation 4 4+   Shoulder Horizontal Abduction     Shoulder Horizontal Adduction     Elbow Flexion 4+ 4+   Elbow Extension 4+ 4+   Mid Trap     Lower Trap     Rhomboid     Serratus Anterior       FLEXIBILITY: Decreased upper trap L, Decreased pectoralis major L, Decreased pectoralis minor L, Decreased latissimus L  SPECIAL TESTS: +impingement signs  PALPATION:   JOINT MOBILITY:    Left Right   Glenohumeral anterior WNL     Glenohumeral posterior Hypomobile    Glenohumeral inferior Hypomobile    Acromioclavicular     Scapulothoracic Hypomobile    Sternoclavicular     Scapulohumeral rhythm       CERVICAL SCREEN: WNL    Assessment & Plan   CLINICAL IMPRESSIONS  Medical Diagnosis: L shoulder pain    Treatment Diagnosis: L shoulder pain   Impression/Assessment: Patient is a 60 year old female with L shoulder impingement  complaints.  The following significant findings have been identified: Pain, Decreased ROM/flexibility, Decreased joint mobility, Decreased strength, Decreased activity tolerance, and Impaired posture. These impairments interfere with their ability to perform self care tasks, recreational activities, and community mobility as compared to previous level of function.     Clinical Decision Making (Complexity):  Clinical Presentation: Stable/Uncomplicated  Clinical Presentation Rationale: based on medical and personal factors listed in PT evaluation  Clinical Decision Making (Complexity): Low complexity    PLAN OF CARE  Treatment Interventions:  Interventions: Manual Therapy, Neuromuscular Re-education, Therapeutic Activity, Therapeutic Exercise, Self-Care/Home Management    Long Term Goals            Frequency of Treatment: 1 x a week  Duration of Treatment: 3 weeks tapering to 2 times a month x 2 months    Recommended Referrals to Other Professionals: Physical Therapy  Education Assessment:        Risks and benefits of evaluation/treatment have been explained.   Patient/Family/caregiver agrees with Plan of Care.     Evaluation Time:             Signing Clinician: Santos Sloan PT

## 2025-05-22 DIAGNOSIS — L40.9 PSORIASIS: ICD-10-CM

## 2025-05-22 DIAGNOSIS — K21.00 GASTROESOPHAGEAL REFLUX DISEASE WITH ESOPHAGITIS WITHOUT HEMORRHAGE: Primary | ICD-10-CM

## 2025-05-22 DIAGNOSIS — F51.01 PRIMARY INSOMNIA: ICD-10-CM

## 2025-05-22 DIAGNOSIS — J30.2 SEASONAL ALLERGIC RHINITIS, UNSPECIFIED TRIGGER: ICD-10-CM

## 2025-05-22 DIAGNOSIS — L85.3 XEROSIS CUTIS: ICD-10-CM

## 2025-05-22 DIAGNOSIS — J45.40 MODERATE PERSISTENT ASTHMA: ICD-10-CM

## 2025-05-22 NOTE — TELEPHONE ENCOUNTER
Clinic RN: Please investigate patient's chart or contact patient if the information cannot be found because the medication is listed as historical or discontinued. Confirm patient is taking this medication. Document findings and route refill encounter to provider for approval or denial.    Leny MONTENEGRO RN  [facility]

## 2025-05-22 NOTE — TELEPHONE ENCOUNTER
"SN,     Pt returned call regarding refill request for Nexium 20 mg and Albuterol Inhaler.   Pt states above medications are listed as historical because pt was seeing a different provider for health and medication management.   Pt now returning to [facility].   Pt explained changes in providers through out the years are r/t Health Insurance changes, and what she can afford.   Pt had office visit with you on 02/20/25 Urinary Problem. States she discussed with you regarding refilling all her medications until she sees you for Annual Visit scheduled in July.   Writer offered sooner appointment. Pt declined it because \"I'm still paying for my last visit that was around $500. I can't afford to see her sooner\".   OV scheduled on 7/17/25.   Please advise on Refill requests.   Med orders and pharmacy pended for your review and approval if appropriate.     Thank you,   Shea KNIGHT RN      "

## 2025-05-22 NOTE — TELEPHONE ENCOUNTER
Left message for patient to call [facility] back  When patient calls back please transfer to an RN  Xiao CAPPS RN

## 2025-05-25 RX ORDER — FLUOCINOLONE ACETONIDE 0.1 MG/ML
SOLUTION TOPICAL
Qty: 500 ML | Refills: 0 | Status: SHIPPED | OUTPATIENT
Start: 2025-05-25 | End: 2025-05-27

## 2025-05-25 RX ORDER — ESTRADIOL 10 UG/1
10 TABLET, FILM COATED VAGINAL
Qty: 24 TABLET | Refills: 3 | Status: SHIPPED | OUTPATIENT
Start: 2025-05-26

## 2025-05-25 RX ORDER — FLUTICASONE PROPIONATE 110 UG/1
1 AEROSOL, METERED RESPIRATORY (INHALATION) 2 TIMES DAILY
Qty: 12 G | Refills: 3 | OUTPATIENT
Start: 2025-05-25

## 2025-05-25 RX ORDER — FLUTICASONE PROPIONATE AND SALMETEROL 500; 50 UG/1; UG/1
POWDER RESPIRATORY (INHALATION)
OUTPATIENT
Start: 2025-05-25

## 2025-05-25 RX ORDER — MELATONIN 3 MG
1 CAPSULE ORAL AT BEDTIME
Qty: 90 CAPSULE | Refills: 3 | Status: SHIPPED | OUTPATIENT
Start: 2025-05-25

## 2025-05-25 RX ORDER — ALBUTEROL SULFATE 90 UG/1
1-2 AEROSOL, METERED RESPIRATORY (INHALATION) 4 TIMES DAILY
Qty: 18 G | Refills: 3 | Status: SHIPPED | OUTPATIENT
Start: 2025-05-25

## 2025-05-25 RX ORDER — UREA 200 MG/G
CREAM TOPICAL
Qty: 45 G | Refills: 11 | Status: SHIPPED | OUTPATIENT
Start: 2025-05-25

## 2025-05-25 RX ORDER — LORATADINE 10 MG/1
10 TABLET ORAL DAILY
Qty: 90 TABLET | Refills: 3 | Status: SHIPPED | OUTPATIENT
Start: 2025-05-25

## 2025-05-25 NOTE — TELEPHONE ENCOUNTER
I refilled all the leds except the controller inhalers - there were 2 controller inhalers can she let me know which she uses or is she uses these intermittently based on her symptoms?    SN

## 2025-05-27 DIAGNOSIS — L40.9 PSORIASIS: ICD-10-CM

## 2025-05-27 DIAGNOSIS — J45.40 MODERATE PERSISTENT ASTHMA: ICD-10-CM

## 2025-05-27 NOTE — TELEPHONE ENCOUNTER
"Pharmacy calling back.   Requesting the requested clarification below on fluocinolone.         Pharmacist also had 2 questions about ventolin inhaler prescription:   1) They note the script was was written as: ALEC 1 - meaning it was for a specific brand only.   Pharmacist notes the brand prescription is not covered, but the pt is reporting they don't care if brand or generic.   2) The patient was frustrated that the script was written as: \"Summary: Inhale 1-2 puffs into the lungs 4 times daily \"  They report it has always been written as Q 4 hours, which would be 6 times daily.     Provider - please adjust script/SIG as necessary    Farzana BELLE RN   "

## 2025-05-27 NOTE — TELEPHONE ENCOUNTER
Dr. Suggs,    [facility] and advise.     Haritha SHEPHERD [facility] [place] (supporting You)22 minutes ago (7:50 AM)     Hi, I use the Advair inhaler 2xs daily   And one puff of the albuterol before the advair,  Also albuterol before exercise, daily  Thank you       Thanks,   Farzana BELLE RN

## 2025-05-27 NOTE — TELEPHONE ENCOUNTER
Pharmacy calling needing clarification on fluocinolone prescription:     Need to clarify frequency- daily twice, 3 times, daily?     Quantity is 500 ml- each bottle is only 60 so how many do they want to give patient, need to know how long the bottle should last, for  example should 60mL last 30 days?    Please update prescription and resend.     Rakel Cho RN

## 2025-05-28 RX ORDER — FLUOCINOLONE ACETONIDE 0.1 MG/ML
SOLUTION TOPICAL
Qty: 500 ML | Refills: 3 | Status: SHIPPED | OUTPATIENT
Start: 2025-05-28 | End: 2025-05-28

## 2025-05-28 RX ORDER — FLUOCINOLONE ACETONIDE 0.1 MG/ML
SOLUTION TOPICAL EVERY 4 HOURS PRN
Qty: 500 ML | Refills: 3 | Status: SHIPPED | OUTPATIENT
Start: 2025-05-28 | End: 2025-06-02

## 2025-05-28 NOTE — TELEPHONE ENCOUNTER
"Hi Dr. Suggs,     Was the message for \"guidelines recommend use of this up to 4 times per day\" directed to the fluocinolone (SYNALAR) 0.01 % solution or for the VENTOLIN HFA  inhaler ?    Thanks!  Farzana BELLE RN     "

## 2025-05-28 NOTE — TELEPHONE ENCOUNTER
I updated the sig - please let pt know that the guidelines recommend use of this up to 4 times per day    Thanks  SN

## 2025-05-29 ENCOUNTER — ANCILLARY PROCEDURE (OUTPATIENT)
Dept: MRI IMAGING | Facility: CLINIC | Age: 60
End: 2025-05-29
Attending: INTERNAL MEDICINE
Payer: COMMERCIAL

## 2025-05-29 DIAGNOSIS — K76.9 LIVER LESION: ICD-10-CM

## 2025-05-29 DIAGNOSIS — C50.919 MALIGNANT NEOPLASM OF BREAST IN FEMALE, ESTROGEN RECEPTOR POSITIVE, UNSPECIFIED LATERALITY, UNSPECIFIED SITE OF BREAST (H): ICD-10-CM

## 2025-05-29 DIAGNOSIS — Z17.0 MALIGNANT NEOPLASM OF BREAST IN FEMALE, ESTROGEN RECEPTOR POSITIVE, UNSPECIFIED LATERALITY, UNSPECIFIED SITE OF BREAST (H): ICD-10-CM

## 2025-05-29 PROCEDURE — A9581 GADOXETATE DISODIUM INJ: HCPCS | Mod: JW | Performed by: STUDENT IN AN ORGANIZED HEALTH CARE EDUCATION/TRAINING PROGRAM

## 2025-05-29 PROCEDURE — 74183 MRI ABD W/O CNTR FLWD CNTR: CPT | Performed by: STUDENT IN AN ORGANIZED HEALTH CARE EDUCATION/TRAINING PROGRAM

## 2025-06-02 RX ORDER — ALBUTEROL SULFATE 90 UG/1
1-2 AEROSOL, METERED RESPIRATORY (INHALATION) EVERY 4 HOURS PRN
Qty: 18 G | Refills: 3 | Status: SHIPPED | OUTPATIENT
Start: 2025-06-02

## 2025-06-02 RX ORDER — FLUOCINOLONE ACETONIDE 0.1 MG/ML
SOLUTION TOPICAL EVERY 6 HOURS PRN
Qty: 500 ML | Refills: 3 | Status: SHIPPED | OUTPATIENT
Start: 2025-06-02

## 2025-06-04 ENCOUNTER — RESULTS FOLLOW-UP (OUTPATIENT)
Dept: ONCOLOGY | Facility: CLINIC | Age: 60
End: 2025-06-04
Payer: COMMERCIAL

## 2025-07-17 ENCOUNTER — OFFICE VISIT (OUTPATIENT)
Dept: FAMILY MEDICINE | Facility: CLINIC | Age: 60
End: 2025-07-17
Payer: COMMERCIAL

## 2025-07-17 VITALS
WEIGHT: 149 LBS | OXYGEN SATURATION: 97 % | HEART RATE: 101 BPM | TEMPERATURE: 97.9 F | HEIGHT: 61 IN | BODY MASS INDEX: 28.13 KG/M2 | SYSTOLIC BLOOD PRESSURE: 124 MMHG | RESPIRATION RATE: 20 BRPM | DIASTOLIC BLOOD PRESSURE: 78 MMHG

## 2025-07-17 DIAGNOSIS — C50.919 MALIGNANT NEOPLASM OF BREAST IN FEMALE, ESTROGEN RECEPTOR POSITIVE, UNSPECIFIED LATERALITY, UNSPECIFIED SITE OF BREAST (H): ICD-10-CM

## 2025-07-17 DIAGNOSIS — M25.50 MULTIPLE JOINT PAIN: ICD-10-CM

## 2025-07-17 DIAGNOSIS — Z00.00 ROUTINE GENERAL MEDICAL EXAMINATION AT A HEALTH CARE FACILITY: Primary | ICD-10-CM

## 2025-07-17 DIAGNOSIS — Z13.6 SCREENING FOR CARDIOVASCULAR CONDITION: ICD-10-CM

## 2025-07-17 DIAGNOSIS — Z17.0 MALIGNANT NEOPLASM OF BREAST IN FEMALE, ESTROGEN RECEPTOR POSITIVE, UNSPECIFIED LATERALITY, UNSPECIFIED SITE OF BREAST (H): ICD-10-CM

## 2025-07-17 DIAGNOSIS — L40.9 PSORIASIS: ICD-10-CM

## 2025-07-17 DIAGNOSIS — K76.0 FATTY LIVER: ICD-10-CM

## 2025-07-17 DIAGNOSIS — Z12.4 CERVICAL CANCER SCREENING: ICD-10-CM

## 2025-07-17 DIAGNOSIS — J45.40 MODERATE PERSISTENT ASTHMA WITHOUT COMPLICATION: ICD-10-CM

## 2025-07-17 LAB
EST. AVERAGE GLUCOSE BLD GHB EST-MCNC: 108 MG/DL
HBA1C MFR BLD: 5.4 % (ref 0–5.6)

## 2025-07-17 SDOH — HEALTH STABILITY: PHYSICAL HEALTH: ON AVERAGE, HOW MANY DAYS PER WEEK DO YOU ENGAGE IN MODERATE TO STRENUOUS EXERCISE (LIKE A BRISK WALK)?: 6 DAYS

## 2025-07-17 ASSESSMENT — ANXIETY QUESTIONNAIRES
6. BECOMING EASILY ANNOYED OR IRRITABLE: SEVERAL DAYS
8. IF YOU CHECKED OFF ANY PROBLEMS, HOW DIFFICULT HAVE THESE MADE IT FOR YOU TO DO YOUR WORK, TAKE CARE OF THINGS AT HOME, OR GET ALONG WITH OTHER PEOPLE?: SOMEWHAT DIFFICULT
GAD7 TOTAL SCORE: 6
5. BEING SO RESTLESS THAT IT IS HARD TO SIT STILL: SEVERAL DAYS
4. TROUBLE RELAXING: SEVERAL DAYS
IF YOU CHECKED OFF ANY PROBLEMS ON THIS QUESTIONNAIRE, HOW DIFFICULT HAVE THESE PROBLEMS MADE IT FOR YOU TO DO YOUR WORK, TAKE CARE OF THINGS AT HOME, OR GET ALONG WITH OTHER PEOPLE: SOMEWHAT DIFFICULT
1. FEELING NERVOUS, ANXIOUS, OR ON EDGE: SEVERAL DAYS
3. WORRYING TOO MUCH ABOUT DIFFERENT THINGS: SEVERAL DAYS
7. FEELING AFRAID AS IF SOMETHING AWFUL MIGHT HAPPEN: NOT AT ALL
GAD7 TOTAL SCORE: 6
2. NOT BEING ABLE TO STOP OR CONTROL WORRYING: SEVERAL DAYS
GAD7 TOTAL SCORE: 6
7. FEELING AFRAID AS IF SOMETHING AWFUL MIGHT HAPPEN: NOT AT ALL

## 2025-07-17 ASSESSMENT — ASTHMA QUESTIONNAIRES
QUESTION_5 LAST FOUR WEEKS HOW WOULD YOU RATE YOUR ASTHMA CONTROL: POORLY CONTROLLED
QUESTION_4 LAST FOUR WEEKS HOW OFTEN HAVE YOU USED YOUR RESCUE INHALER OR NEBULIZER MEDICATION (SUCH AS ALBUTEROL): ONE OR TWO TIMES PER DAY
QUESTION_2 LAST FOUR WEEKS HOW OFTEN HAVE YOU HAD SHORTNESS OF BREATH: ONCE OR TWICE A WEEK
QUESTION_3 LAST FOUR WEEKS HOW OFTEN DID YOUR ASTHMA SYMPTOMS (WHEEZING, COUGHING, SHORTNESS OF BREATH, CHEST TIGHTNESS OR PAIN) WAKE YOU UP AT NIGHT OR EARLIER THAN USUAL IN THE MORNING: NOT AT ALL
ACT_TOTALSCORE: 17
QUESTION_1 LAST FOUR WEEKS HOW MUCH OF THE TIME DID YOUR ASTHMA KEEP YOU FROM GETTING AS MUCH DONE AT WORK, SCHOOL OR AT HOME: A LITTLE OF THE TIME

## 2025-07-17 ASSESSMENT — PATIENT HEALTH QUESTIONNAIRE - PHQ9
SUM OF ALL RESPONSES TO PHQ QUESTIONS 1-9: 5
SUM OF ALL RESPONSES TO PHQ QUESTIONS 1-9: 5
10. IF YOU CHECKED OFF ANY PROBLEMS, HOW DIFFICULT HAVE THESE PROBLEMS MADE IT FOR YOU TO DO YOUR WORK, TAKE CARE OF THINGS AT HOME, OR GET ALONG WITH OTHER PEOPLE: NOT DIFFICULT AT ALL

## 2025-07-17 ASSESSMENT — SOCIAL DETERMINANTS OF HEALTH (SDOH): HOW OFTEN DO YOU GET TOGETHER WITH FRIENDS OR RELATIVES?: MORE THAN THREE TIMES A WEEK

## 2025-07-17 ASSESSMENT — PAIN SCALES - GENERAL: PAINLEVEL_OUTOF10: NO PAIN (0)

## 2025-07-17 NOTE — PROGRESS NOTES
Preventive Care Visit  [facility]  [place], Family Medicine  Jul 17, 2025      Assessment & Plan     Routine general medical examination at a health care facility  reviewed well adults screens vaccines and labs     Malignant neoplasm of breast in female, estrogen receptor positive, unspecified laterality, unspecified site of breast (H)  Pt would like labs today  - Estradiol; Future  - Estradiol    Moderate persistent asthma without complication  Discussed that certainly avoiding poor air quality days and known triggers can be helpful.  She is already doing maximum dose of Advair.  Has not tolerated montelukast well.  Will have her see allergist to consider trying antihistamines and nasal steroids to help with allergic symptoms.  - Adult Allergy/Asthma  Referral; Future    Multiple joint pain  Concerned that either this is a reaction to anastrozole that is lingering or it has increased inflammation that was quietly at bay, possibly associated with her psoriasis.  Will place labs as noted below and will have her see rheumatology for follow-up  - DNA double stranded antibodies; Future  - Anti Nuclear Tigist IgG by IFA with Reflex; Future  - Cyclic Citrullinated Peptide Antibody IgG; Future  - Rheumatoid factor; Future  Referral to rheumatology  - DNA double stranded antibodies  - Anti Nuclear Tigist IgG by IFA with Reflex  - Cyclic Citrullinated Peptide Antibody IgG  - Rheumatoid factor    Psoriasis    - DNA double stranded antibodies; Future  - DNA double stranded antibodies    Fatty liver  Cystic changes appear to be benign however patient is concerned about fatty liver changes.  We discussed importance of low calorie foods maintaining healthy weight avoiding liver toxic medications and limiting alcohol.  Discussed that we can repeat imaging and if she has any right upper quadrant pain or worsening symptoms can refer to hepatology overall discussed that this is largely  "managed with lifestyle change  Will follow for prediabetes diabetes with A1c  - US Abdomen Limited; Future  - Hepatitis C antibody; Future  - Hepatitis C antibody    Cervical cancer screening     - HPV and Gynecologic Cytology Panel - Recommended Age 30 - 65 Years    Screening for cardiovascular condition     - Lipid panel reflex to direct LDL Fasting; Future  - Hemoglobin A1c; Future  - Hemoglobin A1c  Patient has been advised of split billing requirements and indicates understanding: Yes    BMI  Estimated body mass index is 28.15 kg/m  as calculated from the following:    Height as of this encounter: 1.549 m (5' 1\").    Weight as of this encounter: 67.6 kg (149 lb).   Weight management plan: Discussed healthy diet and exercise guidelines    Counseling  Appropriate preventive services were addressed with this patient via screening, questionnaire, or discussion as appropriate for fall prevention, nutrition, physical activity, Tobacco-use cessation, social engagement, weight loss and cognition.  Checklist reviewing preventive services available has been given to the patient.  Reviewed patient's diet, addressing concerns and/or questions.   She is at risk for psychosocial distress and has been provided with information to reduce risk.   Reviewed preventive health counseling, as reflected in patient instructions        Kyra   Haritha is a 60 year old, presenting for the following:  Physical        7/17/2025    10:54 AM   Additional Questions   Roomed by ISIDRA MOORE   Accompanied by NBelemA         7/17/2025    10:54 AM   Patient Reported Additional Medications   Patient reports taking the following new medications N/A          HPI      1)  DCIS breast cancer:   - labs are due, has yearly mammogram planned as follow up.  Had DCIS - surveillance currently after lumpectomy did not tolerate anastrozole - lots of side effects noted -- jaw clenching, vag dryness, arthalgias myalgias etc  Vagifem is helping vaginal " dryness  Really bad joint pain - affecting distal joints in hands and stiffness    2) hemangiomas/cysts noted on the liver and fatty liver changes   lfts are fine MRI shows no concerning findings  Has used alcohol as a teen - some but not excessive, some weight gain no meds that are liver toxic  3) asthma   - needs referral - feels worsening need to use inhaler despite controller advair 500/50.  Has tried montelukast in the past - had side effects with this and poor response to montelukast  Is taking advair twice daily and needing resue frequently  Does admit to triggers such as air quality    4) Psoriasis:  Has seen derm but not in a long time   -- now with joint pain hard to tell if this is extension of psoriasis or a med reaction  Has not seen rheumatology  We discussed labs          Advance Care Planning    Discussed advance care planning with patient; informed AVS has link to [facility].        7/17/2025   General Health   How would you rate your overall physical health? (!) FAIR   Feel stress (tense, anxious, or unable to sleep) To some extent   (!) STRESS CONCERN      7/17/2025   Nutrition   Three or more servings of calcium each day? Yes   Diet: Gluten-free/reduced   How many servings of fruit and vegetables per day? 4 or more   How many sweetened beverages each day? 0-1         7/17/2025   Exercise   Days per week of moderate/strenous exercise 6 days         7/17/2025   Social Factors   Frequency of gathering with friends or relatives More than three times a week   Worry food won't last until get money to buy more No   Food not last or not have enough money for food? No   Do you have housing? (Housing is defined as stable permanent housing and does not include staying outside in a car, in a tent, in an abandoned building, in an overnight shelter, or couch-surfing.) Yes   Are you worried about losing your housing? No   Lack of transportation? No   Unable to get utilities (heat,electricity)? No          2025   Fall Risk   Fallen 2 or more times in the past year? No   Trouble with walking or balance? No          2025   Dental   Dentist two times every year? Yes       Today's PHQ-9 Score:       2025    10:46 AM   PHQ-9 SCORE   PHQ-9 Total Score MyChart 5 (Mild depression)   PHQ-9 Total Score 5        Patient-reported         2025   Substance Use   Alcohol more than 3/day or more than 7/wk Not Applicable   Do you use any other substances recreationally? 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 No     History of abnormal Pap smear: no - year ago not bit in the last 10-15 years        10/31/2014    12:54 PM 4/15/2010    12:00 AM 2006    12:00 AM   PAP / HPV   PAP Negative for squamous intraepithelial lesion or malignancy  Electronically signed by Rebecca Prasad CT (ASCP) on 2014 at 11:28 AM        PAP (Historical)  NIL  NIL      ASCVD Risk   The ASCVD Risk score (Jeremiah DK, et al., 2019) failed to calculate for the following reasons:    Cannot find a previous HDL lab    Cannot find a previous total cholesterol lab           Reviewed and updated  "as needed this visit by Provider                          Review of Systems  Constitutional, HEENT, cardiovascular, pulmonary, gi and gu systems are negative, except as otherwise noted.     Objective    Exam  LMP  (LMP Unknown)    Estimated body mass index is 29.36 kg/m  as calculated from the following:    Height as of 10/14/24: 1.549 m (5' 1\").    Weight as of 5/1/25: 70.5 kg (155 lb 6.4 oz).    Physical Exam  GENERAL: alert and no distress  EYES: Eyes grossly normal to inspection, PERRL and conjunctivae and sclerae normal  HENT: ear canals and TM's normal, nose and mouth without ulcers or lesions  NECK: no adenopathy, no asymmetry, masses, or scars  RESP: lungs clear to auscultation - no rales, rhonchi or wheezes  BREAST: normal without masses, tenderness or nipple discharge and no palpable axillary masses or adenopathy  CV: regular rate and rhythm, normal S1 S2, no S3 or S4, no murmur, click or rub, no peripheral edema  ABDOMEN: soft, nontender, no hepatosplenomegaly, no masses and bowel sounds normal  MS: DIP joints in all fingers are enlarged and appear arthritic.  Tender   SKIN: no suspicious lesions or rashes  NEURO: Normal strength and tone, mentation intact and speech normal  PSYCH: mentation appears normal, affect normal/bright        Signed Electronically by: Es Tracy MD    Answers submitted by the patient for this visit:  Patient Health Questionnaire (Submitted on 7/17/2025)  If you checked off any problems, how difficult have these problems made it for you to do your work, take care of things at home, or get along with other people?: Not difficult at all  PHQ9 TOTAL SCORE: 5  Patient Health Questionnaire (G7) (Submitted on 7/17/2025)  YOSHI 7 TOTAL SCORE: 6    "

## 2025-07-17 NOTE — PATIENT INSTRUCTIONS
Patient Education   Preventive Care Advice   This is general advice given by our system to help you stay healthy. However, your care team may have specific advice just for you. Please talk to your care team about your preventive care needs.  Nutrition  Eat 5 or more servings of fruits and vegetables each day.  Try wheat bread, brown rice and whole grain pasta (instead of white bread, rice, and pasta).  Get enough calcium and vitamin D. Check the label on foods and aim for 100% of the RDA (recommended daily allowance).  Lifestyle  Exercise at least 150 minutes each week  (30 minutes a day, 5 days a week).  Do muscle strengthening activities 2 days a week. These help control your weight and prevent disease.  No smoking.  Wear sunscreen to prevent skin cancer.  Have a dental exam and cleaning every 6 months.  Yearly exams  See your health care team every year to talk about:  Any changes in your health.  Any medicines your care team has prescribed.  Preventive care, family planning, and ways to prevent chronic diseases.  Shots (vaccines)   HPV shots (up to age 26), if you've never had them before.  Hepatitis B shots (up to age 59), if you've never had them before.  COVID-19 shot: Get this shot when it's due.  Flu shot: Get a flu shot every year.  Tetanus shot: Get a tetanus shot every 10 years.  Pneumococcal, hepatitis A, and RSV shots: Ask your care team if you need these based on your risk.  Shingles shot (for age 50 and up)  General health tests  Diabetes screening:  Starting at age 35, Get screened for diabetes at least every 3 years.  If you are younger than age 35, ask your care team if you should be screened for diabetes.  Cholesterol test: At age 39, start having a cholesterol test every 5 years, or more often if advised.  Bone density scan (DEXA): At age 50, ask your care team if you should have this scan for osteoporosis (brittle bones).  Hepatitis C: Get tested at least once in your life.  STIs (sexually  transmitted infections)  Before age 24: Ask your care team if you should be screened for STIs.  After age 24: Get screened for STIs if you're at risk. You are at risk for STIs (including HIV) if:  You are sexually active with more than one person.  You don't use condoms every time.  You or a partner was diagnosed with a sexually transmitted infection.  If you are at risk for HIV, ask about PrEP medicine to prevent HIV.  Get tested for HIV at least once in your life, whether you are at risk for HIV or not.  Cancer screening tests  Cervical cancer screening: If you have a cervix, begin getting regular cervical cancer screening tests starting at age 21.  Breast cancer scan (mammogram): If you've ever had breasts, begin having regular mammograms starting at age 40. This is a scan to check for breast cancer.  Colon cancer screening: It is important to start screening for colon cancer at age 45.  Have a colonoscopy test every 10 years (or more often if you're at risk) Or, ask your provider about stool tests like a FIT test every year or Cologuard test every 3 years.  To learn more about your testing options, visit:   .  For help making a decision, visit:   https://bit.ly/uk50188.  Prostate cancer screening test: If you have a prostate, ask your care team if a prostate cancer screening test (PSA) at age 55 is right for you.  Lung cancer screening: If you are a current or former smoker ages 50 to 80, ask your care team if ongoing lung cancer screenings are right for you.  For informational purposes only. Not to replace the advice of your health care provider. Copyright   2023 [facility]. All rights reserved. Clinically reviewed by the [facility] Transitions Program. [employer] 781942 - REV 01/24.  Learning About Stress  What is stress?     Stress is your body's response to a hard situation. Your body can have a physical, emotional, or mental response. Stress is a fact of life for most people, and it  affects everyone differently. What causes stress for you may not be stressful for someone else.  A lot of things can cause stress. You may feel stress when you go on a job interview, take a test, or run a race. This kind of short-term stress is normal and even useful. It can help you if you need to work hard or react quickly. For example, stress can help you finish an important job on time.  Long-term stress is caused by ongoing stressful situations or events. Examples of long-term stress include long-term health problems, ongoing problems at work, or conflicts in your family. Long-term stress can harm your health.  How does stress affect your health?  When you are stressed, your body responds as though you are in danger. It makes hormones that speed up your heart, make you breathe faster, and give you a burst of energy. This is called the fight-or-flight stress response. If the stress is over quickly, your body goes back to normal and no harm is done.  But if stress happens too often or lasts too long, it can have bad effects. Long-term stress can make you more likely to get sick, and it can make symptoms of some diseases worse. If you tense up when you are stressed, you may develop neck, shoulder, or low back pain. Stress is linked to high blood pressure and heart disease.  Stress also harms your emotional health. It can make you suazo, tense, or depressed. Your relationships may suffer, and you may not do well at work or school.  What can you do to manage stress?  You can try these things to help manage stress:   Do something active. Exercise or activity can help reduce stress. Walking is a great way to get started. Even everyday activities such as housecleaning or yard work can help.  Try yoga or cristal chi. These techniques combine exercise and meditation. You may need some training at first to learn them.  Do something you enjoy. For example, listen to music or go to a movie. Practice your hobby or do volunteer  "work.  Meditate. This can help you relax, because you are not worrying about what happened before or what may happen in the future.  Do guided imagery. Imagine yourself in any setting that helps you feel calm. You can use online videos, books, or a teacher to guide you.  Do breathing exercises. For example:  From a standing position, bend forward from the waist with your knees slightly bent. Let your arms dangle close to the floor.  Breathe in slowly and deeply as you return to a standing position. Roll up slowly and lift your head last.  Hold your breath for just a few seconds in the standing position.  Breathe out slowly and bend forward from the waist.  Let your feelings out. Talk, laugh, cry, and express anger when you need to. Talking with supportive friends or family, a counselor, or a shady leader about your feelings is a healthy way to relieve stress. Avoid discussing your feelings with people who make you feel worse.  Write. It may help to write about things that are bothering you. This helps you find out how much stress you feel and what is causing it. When you know this, you can find better ways to cope.  What can you do to prevent stress?  You might try some of these things to help prevent stress:  Manage your time. This helps you find time to do the things you want and need to do.  Get enough sleep. Your body recovers from the stresses of the day while you are sleeping.  Get support. Your family, friends, and community can make a difference in how you experience stress.  Limit your news feed. Avoid or limit time on social media or news that may make you feel stressed.  Do something active. Exercise or activity can help reduce stress. Walking is a great way to get started.  Where can you learn more?  Go to https://www.[employer].net/patiented  Enter N032 in the search box to learn more about \"Learning About Stress.\"  Current as of: October 24, 2024  Content Version: 14.5 2024-2025 [employer].   Care instructions adapted under license by your healthcare professional. If you have questions about a medical condition or this instruction, always ask your healthcare professional. [employer] disclaims any warranty or liability for your use of this information.    Recovering From Depression: Care Instructions  Overview    Sticking to your treatment plan is important as you recover from depression. It may take time for your symptoms to get better after you start treatment. Try not to give up if you don't feel better right away. Make sure you keep going to counseling and taking any prescribed medicine if they are part of your treatment plan.  Focus on things that can help you feel better, such as being with friends and family. Try to eat healthy foods, be active, and get enough sleep. Take things slowly as you begin to recover.  Follow-up care is a key part of your treatment and safety. Be sure to make and go to all appointments, and call your doctor if you are having problems. It's also a good idea to know your test results and keep a list of the medicines you take.  How can you care for yourself at home?  Be realistic  If you have a large task to do, break it up into smaller steps you can handle, and just do what you can.  You may want to put off important decisions until your depression has lifted. If you have plans that will have a major impact on your life, such as marriage, divorce, or a job change, try to wait a bit. Talk it over with friends and loved ones who can help you look at the overall picture first.  Reaching out to people for help is important. Do not isolate yourself. Let your family and friends help you. Find someone you can trust and confide in, and talk to that person.  Be patient, and be kind to yourself. Remember that depression is not your fault and is not something you can overcome with willpower alone. Treatment is important for depression, just like for any other  illness. Feeling better takes time, and your mood will improve little by little.  Stay active  Stay busy and get outside. Take a walk, or try some other light exercise.  Talk with your doctor about an exercise program. Exercise can help with mild depression.  Go to a movie or concert. Take part in a [employer] activity or other social gathering. Go to a ball game.  Ask a friend to have dinner with you.  Take care of yourself  Eat healthy foods such as fresh fruits and vegetables, whole grains, and lean protein. If you have lost your appetite, eat small snacks rather than large meals.  Avoid using marijuana and other drugs and drinking alcohol. Do not take medicines that have not been prescribed for you. They may interfere with medicines you may be taking for depression, or they may make your depression worse.  Take your medicines exactly as they are prescribed. You may start to feel better within 1 to 3 weeks of taking antidepressant medicine. But it can take as many as 6 to 8 weeks to see more improvement. If you have questions or concerns about your medicines, or if you do not notice any improvement by 3 weeks, talk to your doctor.  Continue to take your medicine after your symptoms improve. Taking your medicine for at least 6 months after you feel better can help keep you from getting depressed again. If this isn't the first time you have been depressed, your doctor may recommend you to take medicine even longer.  If you have any side effects from your medicine, tell your doctor. Many side effects are mild and will go away on their own after you have been taking the medicine for a few weeks. Some may last longer. Talk to your doctor if side effects are bothering you too much. You might be able to try a different medicine.  Continue counseling. It may help prevent depression from returning, especially if you've had multiple episodes of depression. Talk with your counselor if you are having a hard time attending your  sessions or you think the sessions aren't working. Don't just stop going.  Get enough sleep. Talk to your doctor if you are having problems sleeping.  Avoid sleeping pills unless they are prescribed by the doctor treating your depression. Sleeping pills may make you groggy during the day, and they may interact with other medicine you are taking.  If you have any other illnesses, such as diabetes, heart disease, or high blood pressure, make sure to continue with your treatment. Tell your doctor about all of the medicines you take, including those with or without a prescription.  Where to get help 24 hours a day, 7 days a week  If you or someone you know talks about suicide, self-harm, a mental health crisis, a substance use crisis, or any other kind of emotional distress, get help right away. You can:  Call the Suicide and Crisis Lifeline at [employer].  Text HOME to 526843 to access the Crisis Text Line.  Consider saving these numbers in your phone.  Go to [employer] for more information or to chat online.  Call 491 anytime you think you may need emergency care. For example, call if:  You feel like hurting yourself or someone else.  Someone you know has depression and is about to attempt or is attempting suicide.  Where to get help 24 hours a day, 7 days a week  If you or someone you know talks about suicide, self-harm, a mental health crisis, a substance use crisis, or any other kind of emotional distress, get help right away. You can:  Call the Suicide and Crisis Lifeline at 045.  Text HOME to 541089 to access the Crisis Text Line.  Consider saving these numbers in your phone.  Go to [employer] for more information or to chat online.  Call your doctor now or seek immediate medical care if:  You hear voices.  Someone you know has depression and:  Starts to give away possessions.  Uses illegal drugs or drinks alcohol heavily.  Talks or writes about death, including writing suicide notes or talking about guns,  "knives, or pills.  Starts to spend a lot of time alone.  Acts very aggressively or suddenly appears calm.  Watch closely for changes in your health, and be sure to contact your doctor if:  You do not get better as expected.  Where can you learn more?  Go to https://www.[employer].net/patiented  Enter N529 in the search box to learn more about \"Recovering From Depression: Care Instructions.\"  Current as of: July 31, 2024  Content Version: 14.5 2024-2025 [employer].   Care instructions adapted under license by your healthcare professional. If you have questions about a medical condition or this instruction, always ask your healthcare professional. [employer] disclaims any warranty or liability for your use of this information.       "

## 2025-07-21 ENCOUNTER — PATIENT OUTREACH (OUTPATIENT)
Dept: CARE COORDINATION | Facility: CLINIC | Age: 60
End: 2025-07-21
Payer: COMMERCIAL

## 2025-07-21 LAB
CCP AB SER IA-ACNC: 1.5 U/ML
DSDNA AB SER-ACNC: <0.6 IU/ML

## 2025-07-22 LAB
BKR AP ASSOCIATED HPV REPORT: NORMAL
BKR LAB AP GYN ADEQUACY: NORMAL
BKR LAB AP GYN INTERPRETATION: NORMAL
BKR LAB AP PREVIOUS ABNORMAL: NORMAL
PATH REPORT.COMMENTS IMP SPEC: NORMAL
PATH REPORT.COMMENTS IMP SPEC: NORMAL
PATH REPORT.RELEVANT HX SPEC: NORMAL

## 2025-07-24 ENCOUNTER — TELEPHONE (OUTPATIENT)
Dept: DERMATOLOGY | Facility: CLINIC | Age: 60
End: 2025-07-24
Payer: COMMERCIAL

## 2025-07-24 NOTE — TELEPHONE ENCOUNTER
S/w pt who states she has a spot on her nose that came back after biopsy 10 years ago and was to have mohs but did not.  Now the spot looks suspicious.  Also has a spot on her cheek.  Denies growing, bleeding, crusting, or itching.  Scheduled spot only check with Annmarie Herrera on Monday August 11th at 1:15 pm at  [facility].    Deborah JAIN RN  [facility] [place]  [phone]

## 2025-07-24 NOTE — TELEPHONE ENCOUNTER
[facility] Call Center    Phone Message    May a detailed message be left on voicemail: yes     Reason for Call: Other: Pt is self referred with a Hx of BCC and has a recurring changing lesion. Please call back to discuss. Pt is scheduled and on the wait list. Thank you.      Action Taken: Other: [facility]    Travel Screening: Not Applicable     Date of Service:

## 2025-08-11 ENCOUNTER — TELEPHONE (OUTPATIENT)
Dept: DERMATOLOGY | Facility: CLINIC | Age: 60
End: 2025-08-11

## 2025-08-11 ENCOUNTER — OFFICE VISIT (OUTPATIENT)
Dept: DERMATOLOGY | Facility: CLINIC | Age: 60
End: 2025-08-11
Payer: COMMERCIAL

## 2025-08-11 DIAGNOSIS — Z85.828 HX OF BASAL CELL CARCINOMA: ICD-10-CM

## 2025-08-11 DIAGNOSIS — L57.0 AK (ACTINIC KERATOSIS): ICD-10-CM

## 2025-08-11 DIAGNOSIS — L57.8 PHOTOAGING OF SKIN: Primary | ICD-10-CM

## 2025-08-11 PROCEDURE — 17000 DESTRUCT PREMALG LESION: CPT | Performed by: PHYSICIAN ASSISTANT

## 2025-08-11 PROCEDURE — 99203 OFFICE O/P NEW LOW 30 MIN: CPT | Mod: 25 | Performed by: PHYSICIAN ASSISTANT

## 2025-08-11 RX ORDER — TRETINOIN 0.25 MG/G
CREAM TOPICAL
Qty: 45 G | Refills: 1 | Status: SHIPPED | OUTPATIENT
Start: 2025-08-11

## 2025-08-16 ENCOUNTER — MYC MEDICAL ADVICE (OUTPATIENT)
Dept: FAMILY MEDICINE | Facility: CLINIC | Age: 60
End: 2025-08-16
Payer: COMMERCIAL

## 2025-08-16 DIAGNOSIS — J45.40 MODERATE PERSISTENT ASTHMA WITHOUT COMPLICATION: Primary | ICD-10-CM

## 2025-08-16 DIAGNOSIS — R05.9 COUGH: ICD-10-CM

## 2025-08-19 ENCOUNTER — ALLIED HEALTH/NURSE VISIT (OUTPATIENT)
Dept: FAMILY MEDICINE | Facility: CLINIC | Age: 60
End: 2025-08-19
Payer: COMMERCIAL

## 2025-08-19 DIAGNOSIS — Z23 ENCOUNTER FOR IMMUNIZATION: Primary | ICD-10-CM

## 2025-08-19 PROCEDURE — 90677 PCV20 VACCINE IM: CPT

## 2025-08-19 PROCEDURE — 90471 IMMUNIZATION ADMIN: CPT

## 2025-08-19 PROCEDURE — 99207 PR NO CHARGE NURSE ONLY: CPT

## 2025-08-20 RX ORDER — INHALER, ASSIST DEVICES
SPACER (EA) MISCELLANEOUS
Qty: 2 EACH | Refills: 0 | Status: SHIPPED | OUTPATIENT
Start: 2025-08-20

## 2025-08-28 ENCOUNTER — ALLIED HEALTH/NURSE VISIT (OUTPATIENT)
Dept: FAMILY MEDICINE | Facility: CLINIC | Age: 60
End: 2025-08-28
Payer: COMMERCIAL

## 2025-08-28 VITALS — TEMPERATURE: 97.7 F

## 2025-08-28 DIAGNOSIS — Z23 ENCOUNTER FOR IMMUNIZATION: Primary | ICD-10-CM
